# Patient Record
Sex: MALE | Race: WHITE | NOT HISPANIC OR LATINO | URBAN - METROPOLITAN AREA
[De-identification: names, ages, dates, MRNs, and addresses within clinical notes are randomized per-mention and may not be internally consistent; named-entity substitution may affect disease eponyms.]

---

## 2017-08-25 ENCOUNTER — OUTPATIENT (OUTPATIENT)
Dept: OUTPATIENT SERVICES | Facility: HOSPITAL | Age: 77
LOS: 1 days | Discharge: HOME | End: 2017-08-25
Payer: MEDICARE

## 2017-08-25 DIAGNOSIS — Z86.010 PERSONAL HISTORY OF COLONIC POLYPS: ICD-10-CM

## 2017-08-25 DIAGNOSIS — E78.5 HYPERLIPIDEMIA, UNSPECIFIED: ICD-10-CM

## 2017-08-25 DIAGNOSIS — I10 ESSENTIAL (PRIMARY) HYPERTENSION: ICD-10-CM

## 2017-08-25 DIAGNOSIS — Z98.89 OTHER SPECIFIED POSTPROCEDURAL STATES: Chronic | ICD-10-CM

## 2017-08-25 DIAGNOSIS — D12.0 BENIGN NEOPLASM OF CECUM: ICD-10-CM

## 2017-08-25 DIAGNOSIS — K64.8 OTHER HEMORRHOIDS: ICD-10-CM

## 2017-08-25 DIAGNOSIS — D12.3 BENIGN NEOPLASM OF TRANSVERSE COLON: ICD-10-CM

## 2017-08-25 DIAGNOSIS — K57.30 DIVERTICULOSIS OF LARGE INTESTINE WITHOUT PERFORATION OR ABSCESS WITHOUT BLEEDING: ICD-10-CM

## 2017-08-25 DIAGNOSIS — Z95.2 PRESENCE OF PROSTHETIC HEART VALVE: Chronic | ICD-10-CM

## 2017-08-25 DIAGNOSIS — Z01.818 ENCOUNTER FOR OTHER PREPROCEDURAL EXAMINATION: ICD-10-CM

## 2017-08-25 DIAGNOSIS — Z12.11 ENCOUNTER FOR SCREENING FOR MALIGNANT NEOPLASM OF COLON: ICD-10-CM

## 2017-08-25 LAB
ANION GAP SERPL CALC-SCNC: 9 MMOL/L — SIGNIFICANT CHANGE UP (ref 5–17)
BASOPHILS # BLD AUTO: 0.1 K/UL — SIGNIFICANT CHANGE UP (ref 0–0.2)
BASOPHILS NFR BLD AUTO: 1.2 % — SIGNIFICANT CHANGE UP (ref 0–2)
BUN SERPL-MCNC: 24 MG/DL — HIGH (ref 7–23)
CALCIUM SERPL-MCNC: 10 MG/DL — SIGNIFICANT CHANGE UP (ref 8.5–10.1)
CHLORIDE SERPL-SCNC: 107 MMOL/L — SIGNIFICANT CHANGE UP (ref 96–108)
CO2 SERPL-SCNC: 25 MMOL/L — SIGNIFICANT CHANGE UP (ref 22–31)
CREAT SERPL-MCNC: 1.5 MG/DL — HIGH (ref 0.5–1.3)
EOSINOPHIL # BLD AUTO: 0.1 K/UL — SIGNIFICANT CHANGE UP (ref 0–0.5)
EOSINOPHIL NFR BLD AUTO: 1.4 % — SIGNIFICANT CHANGE UP (ref 0–6)
GLUCOSE SERPL-MCNC: 107 MG/DL — HIGH (ref 70–99)
HCT VFR BLD CALC: 49.2 % — SIGNIFICANT CHANGE UP (ref 39–50)
HGB BLD-MCNC: 16.9 G/DL — SIGNIFICANT CHANGE UP (ref 13–17)
LYMPHOCYTES # BLD AUTO: 3.2 K/UL — SIGNIFICANT CHANGE UP (ref 1–3.3)
LYMPHOCYTES # BLD AUTO: 34.8 % — SIGNIFICANT CHANGE UP (ref 13–44)
MCHC RBC-ENTMCNC: 31 PG — SIGNIFICANT CHANGE UP (ref 27–34)
MCHC RBC-ENTMCNC: 34.4 GM/DL — SIGNIFICANT CHANGE UP (ref 32–36)
MCV RBC AUTO: 90.1 FL — SIGNIFICANT CHANGE UP (ref 80–100)
MONOCYTES # BLD AUTO: 0.7 K/UL — SIGNIFICANT CHANGE UP (ref 0–0.9)
MONOCYTES NFR BLD AUTO: 7.8 % — SIGNIFICANT CHANGE UP (ref 2–14)
NEUTROPHILS # BLD AUTO: 5 K/UL — SIGNIFICANT CHANGE UP (ref 1.8–7.4)
NEUTROPHILS NFR BLD AUTO: 54.8 % — SIGNIFICANT CHANGE UP (ref 43–77)
PLATELET # BLD AUTO: 166 K/UL — SIGNIFICANT CHANGE UP (ref 150–400)
POTASSIUM SERPL-MCNC: 4.1 MMOL/L — SIGNIFICANT CHANGE UP (ref 3.5–5.3)
POTASSIUM SERPL-SCNC: 4.1 MMOL/L — SIGNIFICANT CHANGE UP (ref 3.5–5.3)
RBC # BLD: 5.46 M/UL — SIGNIFICANT CHANGE UP (ref 4.2–5.8)
RBC # FLD: 13.7 % — SIGNIFICANT CHANGE UP (ref 10.3–14.5)
SODIUM SERPL-SCNC: 141 MMOL/L — SIGNIFICANT CHANGE UP (ref 135–145)
WBC # BLD: 9.1 K/UL — SIGNIFICANT CHANGE UP (ref 3.8–10.5)
WBC # FLD AUTO: 9.1 K/UL — SIGNIFICANT CHANGE UP (ref 3.8–10.5)

## 2017-08-25 PROCEDURE — 93010 ELECTROCARDIOGRAM REPORT: CPT

## 2017-08-25 NOTE — CHART NOTE - NSCHARTNOTEFT_GEN_A_CORE
Vital signs  Pulse 59  B/P 150/71  Temperature 97.5  Respiration 18  SpO2 98%  Plan  1. Expect a call from Endoscopy the day before your procedure between 11am and 3pm.  2. Follow the GI doctor's instructions for preparation  and day before procedure activities.  3. Follow the GI doctor's  instructions for medications.

## 2017-08-25 NOTE — ASU PATIENT PROFILE, ADULT - PMH
MIRA (acute kidney injury)    Anxiety    Essential hypertension    History of colon polyps    Hyperlipidemia, unspecified hyperlipidemia type    Nonrheumatic aortic valve stenosis    Obesity, unspecified obesity severity, unspecified obesity type    Renal calculi

## 2017-08-25 NOTE — ASU PATIENT PROFILE, ADULT - PSH
History of lithotripsy    S/P AVR (aortic valve replacement)  7/1/2016  S/P knee surgery  bilateral torn meniscus

## 2017-08-25 NOTE — ASU PATIENT PROFILE, ADULT - VISION (WITH CORRECTIVE LENSES IF THE PATIENT USUALLY WEARS THEM):
Normal vision: sees adequately in most situations; can see medication labels, newsprint/bifocal lenses

## 2017-09-01 ENCOUNTER — RESULT REVIEW (OUTPATIENT)
Age: 77
End: 2017-09-01

## 2017-09-01 ENCOUNTER — OUTPATIENT (OUTPATIENT)
Dept: OUTPATIENT SERVICES | Facility: HOSPITAL | Age: 77
LOS: 1 days | Discharge: HOME | End: 2017-09-01
Payer: MEDICARE

## 2017-09-01 VITALS
TEMPERATURE: 97 F | OXYGEN SATURATION: 100 % | RESPIRATION RATE: 13 BRPM | DIASTOLIC BLOOD PRESSURE: 90 MMHG | WEIGHT: 214.95 LBS | HEART RATE: 70 BPM | SYSTOLIC BLOOD PRESSURE: 162 MMHG | HEIGHT: 67 IN

## 2017-09-01 DIAGNOSIS — Z98.89 OTHER SPECIFIED POSTPROCEDURAL STATES: Chronic | ICD-10-CM

## 2017-09-01 DIAGNOSIS — Z95.2 PRESENCE OF PROSTHETIC HEART VALVE: Chronic | ICD-10-CM

## 2017-09-01 PROCEDURE — 88305 TISSUE EXAM BY PATHOLOGIST: CPT | Mod: 26

## 2017-09-01 RX ORDER — ALLOPURINOL 300 MG
1 TABLET ORAL
Qty: 0 | Refills: 0 | COMMUNITY

## 2017-09-01 RX ORDER — SODIUM CHLORIDE 9 MG/ML
1000 INJECTION INTRAMUSCULAR; INTRAVENOUS; SUBCUTANEOUS
Qty: 0 | Refills: 0 | Status: DISCONTINUED | OUTPATIENT
Start: 2017-09-01 | End: 2017-09-16

## 2017-09-01 RX ORDER — ESCITALOPRAM OXALATE 10 MG/1
1 TABLET, FILM COATED ORAL
Qty: 0 | Refills: 0 | COMMUNITY

## 2017-09-01 NOTE — ASU PATIENT PROFILE, ADULT - PMH
MIRA (acute kidney injury)    Anxiety    Essential hypertension    Gout    History of colon polyps    Hyperlipidemia, unspecified hyperlipidemia type    Nonrheumatic aortic valve stenosis    Obesity, unspecified obesity severity, unspecified obesity type    Renal calculi

## 2017-09-05 LAB — SURGICAL PATHOLOGY FINAL REPORT - CH: SIGNIFICANT CHANGE UP

## 2017-09-11 DIAGNOSIS — D12.0 BENIGN NEOPLASM OF CECUM: ICD-10-CM

## 2017-09-11 DIAGNOSIS — Z12.11 ENCOUNTER FOR SCREENING FOR MALIGNANT NEOPLASM OF COLON: ICD-10-CM

## 2017-09-11 DIAGNOSIS — E78.5 HYPERLIPIDEMIA, UNSPECIFIED: ICD-10-CM

## 2017-09-11 DIAGNOSIS — K57.30 DIVERTICULOSIS OF LARGE INTESTINE WITHOUT PERFORATION OR ABSCESS WITHOUT BLEEDING: ICD-10-CM

## 2017-09-11 DIAGNOSIS — Z95.2 PRESENCE OF PROSTHETIC HEART VALVE: ICD-10-CM

## 2017-09-11 DIAGNOSIS — F41.9 ANXIETY DISORDER, UNSPECIFIED: ICD-10-CM

## 2017-09-11 DIAGNOSIS — K64.8 OTHER HEMORRHOIDS: ICD-10-CM

## 2017-09-11 DIAGNOSIS — D12.3 BENIGN NEOPLASM OF TRANSVERSE COLON: ICD-10-CM

## 2017-09-11 DIAGNOSIS — Z79.82 LONG TERM (CURRENT) USE OF ASPIRIN: ICD-10-CM

## 2017-09-11 DIAGNOSIS — I10 ESSENTIAL (PRIMARY) HYPERTENSION: ICD-10-CM

## 2017-09-11 DIAGNOSIS — M10.9 GOUT, UNSPECIFIED: ICD-10-CM

## 2019-07-18 ENCOUNTER — EMERGENCY (EMERGENCY)
Facility: HOSPITAL | Age: 79
LOS: 0 days | Discharge: HOME | End: 2019-07-18
Attending: EMERGENCY MEDICINE | Admitting: EMERGENCY MEDICINE
Payer: MEDICARE

## 2019-07-18 VITALS — HEIGHT: 67 IN | WEIGHT: 210.98 LBS

## 2019-07-18 VITALS
RESPIRATION RATE: 16 BRPM | OXYGEN SATURATION: 100 % | TEMPERATURE: 98 F | SYSTOLIC BLOOD PRESSURE: 153 MMHG | DIASTOLIC BLOOD PRESSURE: 77 MMHG

## 2019-07-18 DIAGNOSIS — I10 ESSENTIAL (PRIMARY) HYPERTENSION: ICD-10-CM

## 2019-07-18 DIAGNOSIS — R19.7 DIARRHEA, UNSPECIFIED: ICD-10-CM

## 2019-07-18 DIAGNOSIS — Z98.89 OTHER SPECIFIED POSTPROCEDURAL STATES: Chronic | ICD-10-CM

## 2019-07-18 DIAGNOSIS — Z87.442 PERSONAL HISTORY OF URINARY CALCULI: ICD-10-CM

## 2019-07-18 DIAGNOSIS — E78.5 HYPERLIPIDEMIA, UNSPECIFIED: ICD-10-CM

## 2019-07-18 DIAGNOSIS — R19.5 OTHER FECAL ABNORMALITIES: ICD-10-CM

## 2019-07-18 DIAGNOSIS — Z95.2 PRESENCE OF PROSTHETIC HEART VALVE: ICD-10-CM

## 2019-07-18 DIAGNOSIS — Z79.82 LONG TERM (CURRENT) USE OF ASPIRIN: ICD-10-CM

## 2019-07-18 DIAGNOSIS — M10.9 GOUT, UNSPECIFIED: ICD-10-CM

## 2019-07-18 DIAGNOSIS — Z95.2 PRESENCE OF PROSTHETIC HEART VALVE: Chronic | ICD-10-CM

## 2019-07-18 DIAGNOSIS — K92.1 MELENA: ICD-10-CM

## 2019-07-18 LAB
ALBUMIN SERPL ELPH-MCNC: 3.4 G/DL — SIGNIFICANT CHANGE UP (ref 3.3–5)
ALP SERPL-CCNC: 100 U/L — SIGNIFICANT CHANGE UP (ref 40–120)
ALT FLD-CCNC: 27 U/L — SIGNIFICANT CHANGE UP (ref 12–78)
ANION GAP SERPL CALC-SCNC: 6 MMOL/L — SIGNIFICANT CHANGE UP (ref 5–17)
APTT BLD: 28.1 SEC — SIGNIFICANT CHANGE UP (ref 27.5–36.3)
AST SERPL-CCNC: 28 U/L — SIGNIFICANT CHANGE UP (ref 15–37)
BASOPHILS # BLD AUTO: 0.03 K/UL — SIGNIFICANT CHANGE UP (ref 0–0.2)
BASOPHILS NFR BLD AUTO: 0.5 % — SIGNIFICANT CHANGE UP (ref 0–2)
BILIRUB SERPL-MCNC: 0.4 MG/DL — SIGNIFICANT CHANGE UP (ref 0.2–1.2)
BUN SERPL-MCNC: 25 MG/DL — HIGH (ref 7–23)
CALCIUM SERPL-MCNC: 9.3 MG/DL — SIGNIFICANT CHANGE UP (ref 8.5–10.1)
CHLORIDE SERPL-SCNC: 108 MMOL/L — SIGNIFICANT CHANGE UP (ref 96–108)
CO2 SERPL-SCNC: 28 MMOL/L — SIGNIFICANT CHANGE UP (ref 22–31)
CREAT SERPL-MCNC: 1.48 MG/DL — HIGH (ref 0.5–1.3)
EOSINOPHIL # BLD AUTO: 0.09 K/UL — SIGNIFICANT CHANGE UP (ref 0–0.5)
EOSINOPHIL NFR BLD AUTO: 1.4 % — SIGNIFICANT CHANGE UP (ref 0–6)
GLUCOSE SERPL-MCNC: 171 MG/DL — HIGH (ref 70–99)
HCT VFR BLD CALC: 43.7 % — SIGNIFICANT CHANGE UP (ref 39–50)
HGB BLD-MCNC: 14.8 G/DL — SIGNIFICANT CHANGE UP (ref 13–17)
IMM GRANULOCYTES NFR BLD AUTO: 0.2 % — SIGNIFICANT CHANGE UP (ref 0–1.5)
INR BLD: 1.15 RATIO — SIGNIFICANT CHANGE UP (ref 0.88–1.16)
LYMPHOCYTES # BLD AUTO: 1.87 K/UL — SIGNIFICANT CHANGE UP (ref 1–3.3)
LYMPHOCYTES # BLD AUTO: 28.3 % — SIGNIFICANT CHANGE UP (ref 13–44)
MCHC RBC-ENTMCNC: 31 PG — SIGNIFICANT CHANGE UP (ref 27–34)
MCHC RBC-ENTMCNC: 33.9 GM/DL — SIGNIFICANT CHANGE UP (ref 32–36)
MCV RBC AUTO: 91.4 FL — SIGNIFICANT CHANGE UP (ref 80–100)
MONOCYTES # BLD AUTO: 0.49 K/UL — SIGNIFICANT CHANGE UP (ref 0–0.9)
MONOCYTES NFR BLD AUTO: 7.4 % — SIGNIFICANT CHANGE UP (ref 2–14)
NEUTROPHILS # BLD AUTO: 4.12 K/UL — SIGNIFICANT CHANGE UP (ref 1.8–7.4)
NEUTROPHILS NFR BLD AUTO: 62.2 % — SIGNIFICANT CHANGE UP (ref 43–77)
PLATELET # BLD AUTO: 151 K/UL — SIGNIFICANT CHANGE UP (ref 150–400)
POTASSIUM SERPL-MCNC: 4.2 MMOL/L — SIGNIFICANT CHANGE UP (ref 3.5–5.3)
POTASSIUM SERPL-SCNC: 4.2 MMOL/L — SIGNIFICANT CHANGE UP (ref 3.5–5.3)
PROT SERPL-MCNC: 6.9 GM/DL — SIGNIFICANT CHANGE UP (ref 6–8.3)
PROTHROM AB SERPL-ACNC: 12.8 SEC — SIGNIFICANT CHANGE UP (ref 10–12.9)
RBC # BLD: 4.78 M/UL — SIGNIFICANT CHANGE UP (ref 4.2–5.8)
RBC # FLD: 14.2 % — SIGNIFICANT CHANGE UP (ref 10.3–14.5)
SODIUM SERPL-SCNC: 142 MMOL/L — SIGNIFICANT CHANGE UP (ref 135–145)
WBC # BLD: 6.61 K/UL — SIGNIFICANT CHANGE UP (ref 3.8–10.5)
WBC # FLD AUTO: 6.61 K/UL — SIGNIFICANT CHANGE UP (ref 3.8–10.5)

## 2019-07-18 PROCEDURE — 93010 ELECTROCARDIOGRAM REPORT: CPT

## 2019-07-18 PROCEDURE — 99284 EMERGENCY DEPT VISIT MOD MDM: CPT

## 2019-07-18 NOTE — ED PROVIDER NOTE - NSFOLLOWUPINSTRUCTIONS_ED_ALL_ED_FT
please call and follow up with your doctor in 1- 3 days.    Acute Diarrhea    WHAT YOU NEED TO KNOW:    Acute diarrhea starts quickly and lasts a short time, usually 1 to 3 days. It can last up to 2 weeks. You may not be able to control your diarrhea. Acute diarrhea usually stops on its own.     DISCHARGE INSTRUCTIONS:    Return to the emergency department if:     You feel confused.       Your heartbeat is faster than usual.       Your eyes look deeply sunken, or you have no tears when you cry.       You urinate less than usual, or your urine is dark yellow.       You have blood or mucus in your bowel movements.      You have severe abdominal pain.       You are unable to drink any liquids.     Contact your healthcare provider if:     Your symptoms do not get better with treatment.       You have a fever higher than 101.3°F (38.5°C).       You have trouble eating and drinking because you are vomiting.       Your diarrhea does not get better in 7 days.       You have questions or concerns about your condition or care.     Follow up with your healthcare provider as directed: Write down your questions so you remember to ask them during your visits.     Medicines:    Diarrhea medicine is an over-the-counter medicine that helps slow or stop your diarrhea. Do not take this medicine unless your healthcare provider says it is okay.       Antibiotics may be given to help treat an infection caused by bacteria.       Antiparasitics may be given to treat an infection caused by parasites.       Take your medicine as directed. Contact your healthcare provider if you think your medicine is not helping or if you have side effects. Tell him of her if you are allergic to any medicine. Keep a list of the medicines, vitamins, and herbs you take. Include the amounts, and when and why you take them. Bring the list or the pill bottles to follow-up visits. Carry your medicine list with you in case of an emergency.    Self-care:     Drink liquids as directed. Liquids will help prevent dehydration caused by diarrhea. Ask your healthcare provider how much liquid to drink each day and which liquids are best for you. You may need to drink an oral rehydration solution (ORS). An ORS has the right amounts of water, salts, and sugar you need to replace body fluids. You can buy an ORS at most grocery stores and pharmacies.       Eat foods that are easy to digest. Examples include rice, lentils, cereal, bananas, potatoes, and bread. It also includes some fruits (bananas, melon), well-cooked vegetables, and lean meats. Do not eat foods high in fiber, fat, and sugar. Do not drink alcohol until your diarrhea is gone.     Prevent acute diarrhea:     Wash your hands often. Use soap and water. Wash your hands before you eat or prepare food. Also wash your hands after you use the bathroom. Use an alcohol-based hand gel when soap and water are not available. Handwashing           Keep bathroom surfaces clean. This helps prevent the spread of germs that cause acute diarrhea.       Wash fruits and vegetables well before you eat them. This can help remove germs that cause diarrhea. If possible, remove the skin from fruits and vegetables, or cook them well before you eat them.       Cook meat and poultry as directed. Meat includes beef and pork. Poultry includes chicken, turkey, and duck.  Cook ground meat to 160°F.       Cook ground poultry, whole poultry, or cuts of poultry to at least 165°F. Remove the poultry from heat. Let it stand for 3 minutes before you eat it.       Cook whole cuts of meat other than poultry to at least 145°F. Remove the meat from heat. Let it stand for 3 minutes before you eat it.       Wash dishes that have touched raw meat or poultry with hot water and soap. This includes cutting boards, utensils, dishes, and serving containers.       Place raw or cooked meat or poultry in the refrigerator as soon as possible. Bacteria can grow in meat or poultry that is left at room temperature too long.       Do not eat raw or undercooked oysters, clams, or mussels. These foods may be contaminated and cause infection.       Drink only filtered or treated water when you travel. Do not put ice in your drinks. Drink bottled water whenever possible.

## 2019-07-18 NOTE — ED ADULT TRIAGE NOTE - CHIEF COMPLAINT QUOTE
pt aaox4, pt presents to er for "black stool" and diarrhea episode since yesterday, denies dizziness, abd pain.  pt takes aspirin.

## 2019-07-18 NOTE — ED PROVIDER NOTE - CLINICAL SUMMARY MEDICAL DECISION MAKING FREE TEXT BOX
79 y/o pt presents for dark firm stool. On rectal exam pt with brown stool that is heme positive. Plan check labs.

## 2019-07-18 NOTE — ED PROVIDER NOTE - OBJECTIVE STATEMENT
77 y/o male with a PMHx of HTN, HLD, gout, colon polyps, s/p mitral valve replacement presents to the ED c/o melena since yesterday. Pt states he woke up yesterday and had an episode of loose "black" stool. This morning pt had an episode of hard black stool, with subsequent episode of diarrhea. Pt reports appetite is at baseline but notes he was drinking more beer than normal with some friends this past weekend. Denies eating any abnormally dark foods. Takes 81 mg ASA every day, fish oil, B12, grape seed extract. Presents with wife at bedside.

## 2019-07-22 ENCOUNTER — RESULT REVIEW (OUTPATIENT)
Age: 79
End: 2019-07-22

## 2019-07-22 ENCOUNTER — OUTPATIENT (OUTPATIENT)
Dept: OUTPATIENT SERVICES | Facility: HOSPITAL | Age: 79
LOS: 1 days | Discharge: HOME | End: 2019-07-22
Payer: MEDICARE

## 2019-07-22 VITALS
HEIGHT: 67 IN | WEIGHT: 210.98 LBS | OXYGEN SATURATION: 97 % | TEMPERATURE: 97 F | DIASTOLIC BLOOD PRESSURE: 80 MMHG | SYSTOLIC BLOOD PRESSURE: 157 MMHG | HEART RATE: 57 BPM | RESPIRATION RATE: 14 BRPM

## 2019-07-22 DIAGNOSIS — Z95.2 PRESENCE OF PROSTHETIC HEART VALVE: Chronic | ICD-10-CM

## 2019-07-22 DIAGNOSIS — Z98.89 OTHER SPECIFIED POSTPROCEDURAL STATES: Chronic | ICD-10-CM

## 2019-07-22 PROCEDURE — 88312 SPECIAL STAINS GROUP 1: CPT | Mod: 26

## 2019-07-22 PROCEDURE — 88342 IMHCHEM/IMCYTCHM 1ST ANTB: CPT | Mod: 26

## 2019-07-22 PROCEDURE — 88305 TISSUE EXAM BY PATHOLOGIST: CPT | Mod: 26

## 2019-07-24 LAB — SURGICAL PATHOLOGY STUDY: SIGNIFICANT CHANGE UP

## 2019-07-25 DIAGNOSIS — I10 ESSENTIAL (PRIMARY) HYPERTENSION: ICD-10-CM

## 2019-07-25 DIAGNOSIS — B96.81 HELICOBACTER PYLORI [H. PYLORI] AS THE CAUSE OF DISEASES CLASSIFIED ELSEWHERE: ICD-10-CM

## 2019-07-25 DIAGNOSIS — K44.9 DIAPHRAGMATIC HERNIA WITHOUT OBSTRUCTION OR GANGRENE: ICD-10-CM

## 2019-07-25 DIAGNOSIS — K29.50 UNSPECIFIED CHRONIC GASTRITIS WITHOUT BLEEDING: ICD-10-CM

## 2019-07-25 DIAGNOSIS — M10.9 GOUT, UNSPECIFIED: ICD-10-CM

## 2019-07-25 DIAGNOSIS — K92.1 MELENA: ICD-10-CM

## 2020-01-30 NOTE — ED ADULT TRIAGE NOTE - WEIGHT IN LBS
[facility], via phone on 1/30/20.  Martha can be reached at [phone]    INR date: 1/30/20  INR result: 2.3    Tracking has been updated.      RN reports patient takes 3.75mg on Tuesday and 2.5mg on all other days  of warfarin using 2.5mg tabs.  RN states there have been no changes in diet or medications, physical activity, or alcohol.   Denies any recent illnesses, fever, nausea, vomiting or diarrhea.    Electronically signed: FESTUS Olivas       210.9

## 2020-09-22 ENCOUNTER — APPOINTMENT (OUTPATIENT)
Dept: ORTHOPEDIC SURGERY | Facility: CLINIC | Age: 80
End: 2020-09-22
Payer: MEDICARE

## 2020-09-22 VITALS
HEART RATE: 73 BPM | BODY MASS INDEX: 35.68 KG/M2 | TEMPERATURE: 97.8 F | SYSTOLIC BLOOD PRESSURE: 143 MMHG | DIASTOLIC BLOOD PRESSURE: 74 MMHG | HEIGHT: 66 IN | WEIGHT: 222 LBS

## 2020-09-22 DIAGNOSIS — Z86.79 PERSONAL HISTORY OF OTHER DISEASES OF THE CIRCULATORY SYSTEM: ICD-10-CM

## 2020-09-22 DIAGNOSIS — Z82.61 FAMILY HISTORY OF ARTHRITIS: ICD-10-CM

## 2020-09-22 DIAGNOSIS — Z87.39 PERSONAL HISTORY OF OTHER DISEASES OF THE MUSCULOSKELETAL SYSTEM AND CONNECTIVE TISSUE: ICD-10-CM

## 2020-09-22 DIAGNOSIS — Z86.39 PERSONAL HISTORY OF OTHER ENDOCRINE, NUTRITIONAL AND METABOLIC DISEASE: ICD-10-CM

## 2020-09-22 DIAGNOSIS — Z78.9 OTHER SPECIFIED HEALTH STATUS: ICD-10-CM

## 2020-09-22 PROCEDURE — 20610 DRAIN/INJ JOINT/BURSA W/O US: CPT

## 2020-09-22 PROCEDURE — 73562 X-RAY EXAM OF KNEE 3: CPT | Mod: 50

## 2020-09-22 PROCEDURE — 99203 OFFICE O/P NEW LOW 30 MIN: CPT | Mod: 25

## 2020-09-22 RX ORDER — SIMVASTATIN 40 MG/1
40 TABLET, FILM COATED ORAL
Refills: 0 | Status: ACTIVE | COMMUNITY

## 2020-09-22 RX ORDER — ALLOPURINOL 300 MG/1
300 TABLET ORAL
Refills: 0 | Status: ACTIVE | COMMUNITY

## 2020-09-22 RX ORDER — METOPROLOL TARTRATE 50 MG/1
50 TABLET, FILM COATED ORAL
Refills: 0 | Status: ACTIVE | COMMUNITY

## 2020-09-22 RX ORDER — AMLODIPINE BESYLATE 5 MG/1
5 TABLET ORAL
Refills: 0 | Status: ACTIVE | COMMUNITY

## 2020-09-22 RX ADMIN — LIDOCAINE HYDROCHLORIDE 3 %: 10 INJECTION, SOLUTION INFILTRATION; PERINEURAL at 00:00

## 2020-09-22 RX ADMIN — Medication 0 MG/3ML: at 00:00

## 2020-09-23 RX ORDER — LIDOCAINE HYDROCHLORIDE 10 MG/ML
1 INJECTION, SOLUTION INFILTRATION; PERINEURAL
Refills: 0 | Status: COMPLETED | OUTPATIENT
Start: 2020-09-22

## 2020-09-23 RX ORDER — HYALURONATE SOD, CROSS-LINKED 30 MG/3 ML
30 SYRINGE (ML) INTRAARTICULAR
Refills: 0 | Status: COMPLETED | OUTPATIENT
Start: 2020-09-22

## 2020-12-09 ENCOUNTER — OUTPATIENT (OUTPATIENT)
Dept: OUTPATIENT SERVICES | Facility: HOSPITAL | Age: 80
LOS: 1 days | End: 2020-12-09
Payer: MEDICARE

## 2020-12-09 DIAGNOSIS — Z98.89 OTHER SPECIFIED POSTPROCEDURAL STATES: Chronic | ICD-10-CM

## 2020-12-09 DIAGNOSIS — Z11.59 ENCOUNTER FOR SCREENING FOR OTHER VIRAL DISEASES: ICD-10-CM

## 2020-12-09 DIAGNOSIS — Z95.2 PRESENCE OF PROSTHETIC HEART VALVE: Chronic | ICD-10-CM

## 2020-12-09 LAB — SARS-COV-2 RNA SPEC QL NAA+PROBE: DETECTED

## 2020-12-09 PROCEDURE — U0003: CPT

## 2020-12-10 DIAGNOSIS — Z11.59 ENCOUNTER FOR SCREENING FOR OTHER VIRAL DISEASES: ICD-10-CM

## 2020-12-17 ENCOUNTER — INPATIENT (INPATIENT)
Facility: HOSPITAL | Age: 80
LOS: 3 days | Discharge: HOME | DRG: 377 | End: 2020-12-21
Attending: FAMILY MEDICINE | Admitting: HOSPITALIST
Payer: MEDICARE

## 2020-12-17 VITALS
TEMPERATURE: 98 F | HEIGHT: 67 IN | WEIGHT: 162.92 LBS | RESPIRATION RATE: 15 BRPM | SYSTOLIC BLOOD PRESSURE: 127 MMHG | OXYGEN SATURATION: 97 % | HEART RATE: 66 BPM | DIASTOLIC BLOOD PRESSURE: 67 MMHG

## 2020-12-17 DIAGNOSIS — Z98.89 OTHER SPECIFIED POSTPROCEDURAL STATES: Chronic | ICD-10-CM

## 2020-12-17 DIAGNOSIS — K92.2 GASTROINTESTINAL HEMORRHAGE, UNSPECIFIED: ICD-10-CM

## 2020-12-17 DIAGNOSIS — Z95.2 PRESENCE OF PROSTHETIC HEART VALVE: Chronic | ICD-10-CM

## 2020-12-17 LAB
ALBUMIN SERPL ELPH-MCNC: 2.4 G/DL — LOW (ref 3.3–5)
ALBUMIN SERPL ELPH-MCNC: 2.4 G/DL — LOW (ref 3.3–5)
ALP SERPL-CCNC: 77 U/L — SIGNIFICANT CHANGE UP (ref 40–120)
ALP SERPL-CCNC: 99 U/L — SIGNIFICANT CHANGE UP (ref 40–120)
ALT FLD-CCNC: 35 U/L — SIGNIFICANT CHANGE UP (ref 12–78)
ALT FLD-CCNC: 49 U/L — SIGNIFICANT CHANGE UP (ref 12–78)
ANION GAP SERPL CALC-SCNC: 11 MMOL/L — SIGNIFICANT CHANGE UP (ref 5–17)
APTT BLD: 30.7 SEC — SIGNIFICANT CHANGE UP (ref 27.5–35.5)
AST SERPL-CCNC: 46 U/L — HIGH (ref 15–37)
AST SERPL-CCNC: 72 U/L — HIGH (ref 15–37)
BASOPHILS # BLD AUTO: 0 K/UL — SIGNIFICANT CHANGE UP (ref 0–0.2)
BASOPHILS NFR BLD AUTO: 0 % — SIGNIFICANT CHANGE UP (ref 0–2)
BILIRUB DIRECT SERPL-MCNC: 0.1 MG/DL — SIGNIFICANT CHANGE UP (ref 0–0.2)
BILIRUB INDIRECT FLD-MCNC: 0.2 MG/DL — SIGNIFICANT CHANGE UP (ref 0.2–1)
BILIRUB SERPL-MCNC: 0.3 MG/DL — SIGNIFICANT CHANGE UP (ref 0.2–1.2)
BILIRUB SERPL-MCNC: 0.4 MG/DL — SIGNIFICANT CHANGE UP (ref 0.2–1.2)
BUN SERPL-MCNC: 46 MG/DL — HIGH (ref 7–23)
CALCIUM SERPL-MCNC: 8.8 MG/DL — SIGNIFICANT CHANGE UP (ref 8.5–10.1)
CHLORIDE SERPL-SCNC: 109 MMOL/L — HIGH (ref 96–108)
CO2 SERPL-SCNC: 20 MMOL/L — LOW (ref 22–31)
CREAT SERPL-MCNC: 1.58 MG/DL — HIGH (ref 0.5–1.3)
CREAT SERPL-MCNC: 1.71 MG/DL — HIGH (ref 0.5–1.3)
EOSINOPHIL # BLD AUTO: 0 K/UL — SIGNIFICANT CHANGE UP (ref 0–0.5)
EOSINOPHIL NFR BLD AUTO: 0 % — SIGNIFICANT CHANGE UP (ref 0–6)
GLUCOSE SERPL-MCNC: 143 MG/DL — HIGH (ref 70–99)
HCT VFR BLD CALC: 38.9 % — LOW (ref 39–50)
HGB BLD-MCNC: 13 G/DL — SIGNIFICANT CHANGE UP (ref 13–17)
IMM GRANULOCYTES NFR BLD AUTO: 0.6 % — SIGNIFICANT CHANGE UP (ref 0–1.5)
INR BLD: 1.24 RATIO — HIGH (ref 0.88–1.16)
LYMPHOCYTES # BLD AUTO: 1.7 K/UL — SIGNIFICANT CHANGE UP (ref 1–3.3)
LYMPHOCYTES # BLD AUTO: 26.6 % — SIGNIFICANT CHANGE UP (ref 13–44)
MCHC RBC-ENTMCNC: 30.1 PG — SIGNIFICANT CHANGE UP (ref 27–34)
MCHC RBC-ENTMCNC: 33.4 GM/DL — SIGNIFICANT CHANGE UP (ref 32–36)
MCV RBC AUTO: 90 FL — SIGNIFICANT CHANGE UP (ref 80–100)
MONOCYTES # BLD AUTO: 0.48 K/UL — SIGNIFICANT CHANGE UP (ref 0–0.9)
MONOCYTES NFR BLD AUTO: 7.5 % — SIGNIFICANT CHANGE UP (ref 2–14)
NEUTROPHILS # BLD AUTO: 4.18 K/UL — SIGNIFICANT CHANGE UP (ref 1.8–7.4)
NEUTROPHILS NFR BLD AUTO: 65.3 % — SIGNIFICANT CHANGE UP (ref 43–77)
PLATELET # BLD AUTO: 139 K/UL — LOW (ref 150–400)
POTASSIUM SERPL-MCNC: 4.8 MMOL/L — SIGNIFICANT CHANGE UP (ref 3.5–5.3)
POTASSIUM SERPL-SCNC: 4.8 MMOL/L — SIGNIFICANT CHANGE UP (ref 3.5–5.3)
PROT SERPL-MCNC: 5.7 GM/DL — LOW (ref 6–8.3)
PROT SERPL-MCNC: 6.5 GM/DL — SIGNIFICANT CHANGE UP (ref 6–8.3)
PROTHROM AB SERPL-ACNC: 14.2 SEC — HIGH (ref 10.6–13.6)
RBC # BLD: 4.32 M/UL — SIGNIFICANT CHANGE UP (ref 4.2–5.8)
RBC # FLD: 14.6 % — HIGH (ref 10.3–14.5)
SARS-COV-2 RNA SPEC QL NAA+PROBE: DETECTED
SODIUM SERPL-SCNC: 140 MMOL/L — SIGNIFICANT CHANGE UP (ref 135–145)
WBC # BLD: 6.4 K/UL — SIGNIFICANT CHANGE UP (ref 3.8–10.5)
WBC # FLD AUTO: 6.4 K/UL — SIGNIFICANT CHANGE UP (ref 3.8–10.5)

## 2020-12-17 PROCEDURE — 86140 C-REACTIVE PROTEIN: CPT

## 2020-12-17 PROCEDURE — 99223 1ST HOSP IP/OBS HIGH 75: CPT | Mod: CS,AI

## 2020-12-17 PROCEDURE — 80048 BASIC METABOLIC PNL TOTAL CA: CPT

## 2020-12-17 PROCEDURE — 85025 COMPLETE CBC W/AUTO DIFF WBC: CPT

## 2020-12-17 PROCEDURE — 93005 ELECTROCARDIOGRAM TRACING: CPT

## 2020-12-17 PROCEDURE — 82962 GLUCOSE BLOOD TEST: CPT

## 2020-12-17 PROCEDURE — 85379 FIBRIN DEGRADATION QUANT: CPT

## 2020-12-17 PROCEDURE — 97116 GAIT TRAINING THERAPY: CPT | Mod: GP

## 2020-12-17 PROCEDURE — 70450 CT HEAD/BRAIN W/O DYE: CPT

## 2020-12-17 PROCEDURE — 36415 COLL VENOUS BLD VENIPUNCTURE: CPT

## 2020-12-17 PROCEDURE — 74174 CTA ABD&PLVS W/CONTRAST: CPT

## 2020-12-17 PROCEDURE — C9113: CPT

## 2020-12-17 PROCEDURE — 82565 ASSAY OF CREATININE: CPT

## 2020-12-17 PROCEDURE — 83036 HEMOGLOBIN GLYCOSYLATED A1C: CPT

## 2020-12-17 PROCEDURE — 97162 PT EVAL MOD COMPLEX 30 MIN: CPT | Mod: GP

## 2020-12-17 PROCEDURE — 97530 THERAPEUTIC ACTIVITIES: CPT | Mod: GP

## 2020-12-17 PROCEDURE — 85027 COMPLETE CBC AUTOMATED: CPT

## 2020-12-17 PROCEDURE — 71045 X-RAY EXAM CHEST 1 VIEW: CPT | Mod: 26

## 2020-12-17 PROCEDURE — 94640 AIRWAY INHALATION TREATMENT: CPT

## 2020-12-17 PROCEDURE — 82728 ASSAY OF FERRITIN: CPT

## 2020-12-17 PROCEDURE — 74174 CTA ABD&PLVS W/CONTRAST: CPT | Mod: 26

## 2020-12-17 PROCEDURE — 80076 HEPATIC FUNCTION PANEL: CPT

## 2020-12-17 PROCEDURE — C9399: CPT

## 2020-12-17 PROCEDURE — 86769 SARS-COV-2 COVID-19 ANTIBODY: CPT

## 2020-12-17 RX ORDER — REMDESIVIR 5 MG/ML
100 INJECTION INTRAVENOUS EVERY 24 HOURS
Refills: 0 | Status: COMPLETED | OUTPATIENT
Start: 2020-12-18 | End: 2020-12-21

## 2020-12-17 RX ORDER — ATORVASTATIN CALCIUM 80 MG/1
40 TABLET, FILM COATED ORAL AT BEDTIME
Refills: 0 | Status: DISCONTINUED | OUTPATIENT
Start: 2020-12-17 | End: 2020-12-21

## 2020-12-17 RX ORDER — PREGABALIN 225 MG/1
1000 CAPSULE ORAL DAILY
Refills: 0 | Status: DISCONTINUED | OUTPATIENT
Start: 2020-12-17 | End: 2020-12-21

## 2020-12-17 RX ORDER — METOPROLOL TARTRATE 50 MG
100 TABLET ORAL DAILY
Refills: 0 | Status: DISCONTINUED | OUTPATIENT
Start: 2020-12-17 | End: 2020-12-21

## 2020-12-17 RX ORDER — REMDESIVIR 5 MG/ML
INJECTION INTRAVENOUS
Refills: 0 | Status: COMPLETED | OUTPATIENT
Start: 2020-12-17 | End: 2020-12-21

## 2020-12-17 RX ORDER — PANTOPRAZOLE SODIUM 20 MG/1
8 TABLET, DELAYED RELEASE ORAL
Qty: 80 | Refills: 0 | Status: DISCONTINUED | OUTPATIENT
Start: 2020-12-17 | End: 2020-12-21

## 2020-12-17 RX ORDER — SODIUM CHLORIDE 9 MG/ML
1000 INJECTION INTRAMUSCULAR; INTRAVENOUS; SUBCUTANEOUS
Refills: 0 | Status: DISCONTINUED | OUTPATIENT
Start: 2020-12-17 | End: 2020-12-21

## 2020-12-17 RX ORDER — DEXAMETHASONE 0.5 MG/5ML
6 ELIXIR ORAL DAILY
Refills: 0 | Status: DISCONTINUED | OUTPATIENT
Start: 2020-12-17 | End: 2020-12-21

## 2020-12-17 RX ORDER — REMDESIVIR 5 MG/ML
200 INJECTION INTRAVENOUS EVERY 24 HOURS
Refills: 0 | Status: COMPLETED | OUTPATIENT
Start: 2020-12-17 | End: 2020-12-17

## 2020-12-17 RX ORDER — AMLODIPINE BESYLATE 2.5 MG/1
5 TABLET ORAL AT BEDTIME
Refills: 0 | Status: DISCONTINUED | OUTPATIENT
Start: 2020-12-17 | End: 2020-12-21

## 2020-12-17 RX ADMIN — ATORVASTATIN CALCIUM 40 MILLIGRAM(S): 80 TABLET, FILM COATED ORAL at 22:36

## 2020-12-17 RX ADMIN — PANTOPRAZOLE SODIUM 10 MG/HR: 20 TABLET, DELAYED RELEASE ORAL at 17:27

## 2020-12-17 RX ADMIN — SODIUM CHLORIDE 83 MILLILITER(S): 9 INJECTION INTRAMUSCULAR; INTRAVENOUS; SUBCUTANEOUS at 20:48

## 2020-12-17 RX ADMIN — AMLODIPINE BESYLATE 5 MILLIGRAM(S): 2.5 TABLET ORAL at 22:36

## 2020-12-17 RX ADMIN — PANTOPRAZOLE SODIUM 10 MG/HR: 20 TABLET, DELAYED RELEASE ORAL at 20:48

## 2020-12-17 RX ADMIN — REMDESIVIR 500 MILLIGRAM(S): 5 INJECTION INTRAVENOUS at 20:18

## 2020-12-17 RX ADMIN — Medication 6 MILLIGRAM(S): at 20:18

## 2020-12-17 NOTE — ED ADULT NURSE NOTE - NSIMPLEMENTINTERV_GEN_ALL_ED
Implemented All Fall with Harm Risk Interventions:  [place] to call system. Call bell, personal items and telephone within reach. Instruct patient to call for assistance. Room bathroom lighting operational. Non-slip footwear when patient is off stretcher. Physically safe environment: no spills, clutter or unnecessary equipment. Stretcher in lowest position, wheels locked, appropriate side rails in place. Provide visual cue, wrist band, yellow gown, etc. Monitor gait and stability. Monitor for mental status changes and reorient to person, place, and time. Review medications for side effects contributing to fall risk. Reinforce activity limits and safety measures with patient and family. Provide visual clues: red socks.

## 2020-12-17 NOTE — ED ADULT TRIAGE NOTE - HEIGHT IN INCHES
Patient c/o persistent diarrhea for 19 days. Patient saw primary today and Dr. Bean recommended patient speak with Dr. Marcelo about possibly stopping her Aromasin to see if that is the issues. Please call patient back to discuss further.    7

## 2020-12-17 NOTE — ED PROVIDER NOTE - CONSTITUTIONAL, MLM
normal... Weak appearing, awake, alert, oriented to person, place, time/situation and in no apparent distress. Mild pallor.

## 2020-12-17 NOTE — ED ADULT NURSE REASSESSMENT NOTE - NS ED NURSE REASSESS COMMENT FT1
pt. family updated on POC for admission, meal tray order for patient, pt. given PO fluids. pt. has no other complaints at this time.

## 2020-12-17 NOTE — H&P ADULT - ASSESSMENT
79M.  admitted 12/27/20.  presented to ED c/o maroon stools.  was also recently diagnosed w/ COVID-19.    GIB.  - HH stable.  continue to monitor.  - AB CT negative for diverticular bleed.  - hold ASA.  - Protonix gtt.  - GI.    moderate-to-severe COVID-19.  - room air SpO2 91%.  - monitor inflammatory markers.  - CXR:  infiltrates c/w COVID.  - supplemental O2 via NC.  - start:  remdesivir (ALT wnl;  GFR 37).  - start:  dexamethasone.  - DVT prophylaxis:  chemoprophylaxis deferred due to GIB.  will d/w GI.  would like to start as soon as able as patient is at high risk for clotting.  - ID.  - Pulmonology.    DVT prophylaxis.  - SCDs.    disposition.  - general medical vasquez.  - COVID isolation protocol.    communication.  - ED RN. 79M.  admitted 12/27/20.  presented to ED c/o maroon stools.  was also recently diagnosed w/ COVID-19.    GIB.  - HH stable.  continue to monitor.  - AB CT negative for diverticular bleed.  - hold ASA.  - Protonix gtt.  - GI.    moderate-to-severe COVID-19.  - room air SpO2 91%.  - monitor inflammatory markers.  - CXR:  infiltrates c/w COVID.  - supplemental O2 via NC.  - start:  remdesivir (ALT wnl;  GFR 37).  - start:  dexamethasone.  - DVT prophylaxis:  chemoprophylaxis deferred due to GIB.  will d/w GI.  would like to start as soon as able as patient is at high risk for clotting.  - ID.  - Pulmonology.    renal insufficiency.  - monitor BMP.  - hold Lasix.  - ubaldo IVFs.    DVT prophylaxis.  - SCDs.    disposition.  - general medical vasquez.  - COVID isolation protocol.    communication.  - ED RN.

## 2020-12-17 NOTE — ED ADULT NURSE NOTE - OBJECTIVE STATEMENT
pt. c/o cough x 8 days and bloody stools that started few days ago. Pt. denies dizziness, n/v, abd. pain. Pt. states he tested COVID+ approx 8 days ago, his wife was +COVID approx 2 weeks ago. pt. does not appear to be in respiratory distress.

## 2020-12-17 NOTE — ED PROVIDER NOTE - OBJECTIVE STATEMENT
78 y/o male with PMHx of gout, obesity, colon polyps, HLD, acute kidney injury, anxiety, renal calculi, nonrheumatic aortic valve stenosis s/p AVR, HTN, lithotripsy, b/l meniscus surgery presents to ED for bloody stool, syncope. Pt's wife tested positive for COVID 2 weeks ago, pt was tested 8 days ago and found to be COVID positive, asymptomatic at that time. Has been self isolating at home. x4-5 days, noticed dark stools with blood and progressive weakness. Some maroon stool today with mild SOB. Reports fever 3 days ago, none recently. Denies abdominal pain. No hx of GI bleed. Has regular colposcopies with Dr. Damian Rooney.

## 2020-12-17 NOTE — H&P ADULT - HISTORY OF PRESENT ILLNESS
CC:  Patient is a 79y old  Male who presents with a chief complaint of dark stools (17 Dec 2020 15:49)      HPI:  79M.  admitted 12/17/20.  presented to ED c/o maroon stools.  noted dark stools w/ blood.  a/w progressive weakness.  no prior hx of GIB.  patient takes ASA 81mg daily but DENIES use of other NSAIDS or anticoagulants.  no AB pain.    wife tested positive for COVID 2 weeks prior and patient also tested postive 8 days ago.  since, he has been self-isolating.  has had a fever and mild SOB.    PMHx:  severe AS-AVR;  obesity;  HTN;  HLD;  renal calculi;  MIRA;  colonic polyps;  gout.    ROS:      all other review of systems are negative unless indicated above.    PAST MEDICAL & SURGICAL HISTORY:  Gout    Obesity, unspecified obesity severity, unspecified obesity type    History of colon polyps    Hyperlipidemia, unspecified hyperlipidemia type    MIRA (acute kidney injury)    Anxiety    Renal calculi    Nonrheumatic aortic valve stenosis    Essential hypertension    S/P AVR (aortic valve replacement)  7/1/2016    History of lithotripsy    S/P knee surgery  bilateral torn meniscus        Allergies    No Known Allergies    Intolerances        Home Medications:  amLODIPine 5 mg oral tablet: 1 tab(s) orally once a day (in the morning) (22 Jul 2019 11:06)  aspirin 81 mg oral tablet: 1 tab(s) orally once a day (22 Jul 2019 11:06)  chondroitin 400 mg oral capsule: 2 cap(s) orally once a day (22 Jul 2019 11:06)  Fish Oil: 1500 milligram(s) orally (22 Jul 2019 11:06)  furosemide 20 mg oral tablet: 1 tab(s) orally once a day (in the morning) (22 Jul 2019 11:06)  metoprolol tartrate 100 mg oral tablet: 1 tab(s) orally once a day (in the morning) (22 Jul 2019 11:06)  multivitamin: 1 tab(s) orally once a day (22 Jul 2019 11:06)  potassium chloride 10 mEq oral tablet, extended release: 1 tab(s) orally once a day (in the morning) (22 Jul 2019 11:06)  simvastatin 80 mg oral tablet: 1 tab(s) orally once a day (at bedtime) (22 Jul 2019 11:06)  Vitamin B-12: 1 tab(s) orally once a day (22 Jul 2019 11:06)      Social History:  .  lives with wife.    FAMILY HISTORY:  Family history of essential hypertension (Sibling)    Family history of suicide (Father)    Family history of lung cancer (Mother)        Vital Signs Last 24 Hrs  T(C): 36.9 (17 Dec 2020 15:28), Max: 36.9 (17 Dec 2020 15:28)  T(F): 98.4 (17 Dec 2020 15:28), Max: 98.4 (17 Dec 2020 15:28)  HR: 98 (17 Dec 2020 17:26) (66 - 108)  BP: 125/67 (17 Dec 2020 15:28) (125/67 - 127/67)  BP(mean): 79 (17 Dec 2020 15:28) (79 - 79)  RR: 18 (17 Dec 2020 17:26) (15 - 18)  SpO2: 91% (17 Dec 2020 17:26) (91% - 97%)    Constitutional: NAD.   HEENT: PERRL, EOMI, MMM.  Neck: Soft and supple, No carotid bruit, No JVD  Respiratory: Breath sounds are clear bilaterally, No wheezing, rales or rhonchi  Cardiovascular: S1 and S2, regular rate and rhythm, no murmur, rub or gallop.  Gastrointestinal: Bowel Sounds present, soft, nontender, nondistended, no guarding, no rebound, no mass.  Extremities: No peripheral edema  Vascular: 2+ peripheral pulses  Neurological: A/O x 3, no focal deficits  Musculoskeletal: 5/5 strength b/l upper and lower extremities  Skin:  no visible rashes.                             13.0   6.40  )-----------( 139      ( 17 Dec 2020 12:12 )             38.9       PT/INR - ( 17 Dec 2020 12:12 )   PT: 14.2 sec;   INR: 1.24 ratio         PTT - ( 17 Dec 2020 12:12 )  PTT:30.7 sec    12-17    140  |  109<H>  |  46<H>  ----------------------------<  143<H>  4.8   |  20<L>  |  1.71<H>    eGFR if Non : 37: Interpretative comment eGFR if Non : 37: Interpretative comment   Ca    8.8      17 Dec 2020 12:12    TPro  6.5  /  Alb  2.4<L>  /  TBili  0.4  /  DBili  x   /  AST  72<H>  /  ALT  49  /  AlkPhos  99  12-17      LIVER FUNCTIONS - ( 17 Dec 2020 12:12 )  Alb: 2.4 g/dL / Pro: 6.5 gm/dL / ALK PHOS: 99 U/L / ALT: 49 U/L / AST: 72 U/L / GGT: x           < from: Xray Chest 1 View- PORTABLE-Routine (Xray Chest 1 View- PORTABLE-Routine .) (12.17.20 @ 13:12) >  IMPRESSION: Mid lateral infiltrates suggest Covid infection.    < end of copied text >  < from: CT Angio Abdomen and Pelvis w/ IV Cont (12.17.20 @ 17:08) >    IMPRESSION:  No site of active GI bleeding.  Bibasilar groundglass pulmonary opacities consistent with pneumonia.  Gallstones.    < end of copied text >

## 2020-12-18 DIAGNOSIS — N17.9 ACUTE KIDNEY FAILURE, UNSPECIFIED: ICD-10-CM

## 2020-12-18 DIAGNOSIS — I35.0 NONRHEUMATIC AORTIC (VALVE) STENOSIS: ICD-10-CM

## 2020-12-18 DIAGNOSIS — K92.2 GASTROINTESTINAL HEMORRHAGE, UNSPECIFIED: ICD-10-CM

## 2020-12-18 DIAGNOSIS — U07.1 COVID-19: ICD-10-CM

## 2020-12-18 DIAGNOSIS — J12.9 VIRAL PNEUMONIA, UNSPECIFIED: ICD-10-CM

## 2020-12-18 LAB
SARS-COV-2 IGG SERPL QL IA: NEGATIVE — SIGNIFICANT CHANGE UP
SARS-COV-2 IGM SERPL IA-ACNC: 0.46 INDEX — SIGNIFICANT CHANGE UP

## 2020-12-18 PROCEDURE — 99232 SBSQ HOSP IP/OBS MODERATE 35: CPT | Mod: CS

## 2020-12-18 PROCEDURE — 70450 CT HEAD/BRAIN W/O DYE: CPT | Mod: 26

## 2020-12-18 PROCEDURE — 99223 1ST HOSP IP/OBS HIGH 75: CPT | Mod: CS

## 2020-12-18 PROCEDURE — 93010 ELECTROCARDIOGRAM REPORT: CPT

## 2020-12-18 RX ORDER — DEXTROSE 50 % IN WATER 50 %
15 SYRINGE (ML) INTRAVENOUS ONCE
Refills: 0 | Status: DISCONTINUED | OUTPATIENT
Start: 2020-12-18 | End: 2020-12-21

## 2020-12-18 RX ORDER — GLUCAGON INJECTION, SOLUTION 0.5 MG/.1ML
1 INJECTION, SOLUTION SUBCUTANEOUS ONCE
Refills: 0 | Status: DISCONTINUED | OUTPATIENT
Start: 2020-12-18 | End: 2020-12-21

## 2020-12-18 RX ORDER — DEXTROSE 50 % IN WATER 50 %
12.5 SYRINGE (ML) INTRAVENOUS ONCE
Refills: 0 | Status: DISCONTINUED | OUTPATIENT
Start: 2020-12-18 | End: 2020-12-21

## 2020-12-18 RX ORDER — INSULIN LISPRO 100/ML
VIAL (ML) SUBCUTANEOUS
Refills: 0 | Status: DISCONTINUED | OUTPATIENT
Start: 2020-12-18 | End: 2020-12-21

## 2020-12-18 RX ORDER — SODIUM CHLORIDE 9 MG/ML
1000 INJECTION, SOLUTION INTRAVENOUS
Refills: 0 | Status: DISCONTINUED | OUTPATIENT
Start: 2020-12-18 | End: 2020-12-21

## 2020-12-18 RX ORDER — DEXTROSE 50 % IN WATER 50 %
25 SYRINGE (ML) INTRAVENOUS ONCE
Refills: 0 | Status: DISCONTINUED | OUTPATIENT
Start: 2020-12-18 | End: 2020-12-21

## 2020-12-18 RX ADMIN — Medication 6 MILLIGRAM(S): at 10:21

## 2020-12-18 RX ADMIN — PREGABALIN 1000 MICROGRAM(S): 225 CAPSULE ORAL at 10:21

## 2020-12-18 RX ADMIN — SODIUM CHLORIDE 83 MILLILITER(S): 9 INJECTION INTRAMUSCULAR; INTRAVENOUS; SUBCUTANEOUS at 10:21

## 2020-12-18 RX ADMIN — Medication 100 MILLIGRAM(S): at 10:20

## 2020-12-18 RX ADMIN — REMDESIVIR 500 MILLIGRAM(S): 5 INJECTION INTRAVENOUS at 10:19

## 2020-12-18 RX ADMIN — AMLODIPINE BESYLATE 5 MILLIGRAM(S): 2.5 TABLET ORAL at 21:07

## 2020-12-18 RX ADMIN — Medication 1 TABLET(S): at 10:19

## 2020-12-18 RX ADMIN — ATORVASTATIN CALCIUM 40 MILLIGRAM(S): 80 TABLET, FILM COATED ORAL at 21:07

## 2020-12-18 NOTE — CHART NOTE - NSCHARTNOTEFT_GEN_A_CORE
Called by RN to evaluate pt with confusion. Daughter is concerned that the patient is more confused today.    Vital Signs Last 24 Hrs  T(C): 37.1 (17 Dec 2020 20:19), Max: 37.3 (17 Dec 2020 18:19)  T(F): 98.7 (17 Dec 2020 20:19), Max: 99.2 (17 Dec 2020 18:19)  HR: 101 (17 Dec 2020 20:19) (66 - 112)  BP: 128/62 (17 Dec 2020 20:19) (111/67 - 140/61)  BP(mean): 73 (17 Dec 2020 18:19) (69 - 84)  RR: 20 (17 Dec 2020 20:19) (15 - 22)  SpO2: 96% (17 Dec 2020 18:19) (91% - 97%)    Gen: alert and oriented x 3, NAD  Cardiac: s1 s2 regular  Lungs: good air entry b/l  Abd: + BS, soft, NT, ND  Ext: no edema    AMS  -CT head negative for Called by RN to evaluate pt with confusion. Daughter is concerned that the patient is confused for the past 4 days.   Patient denies any complaints. Denies headache, dizziness, numbness, tingling or weakness.    Patient seen and examined at bedside.     Vital Signs Last 24 Hrs  T(C): 37.1 (17 Dec 2020 20:19), Max: 37.3 (17 Dec 2020 18:19)  T(F): 98.7 (17 Dec 2020 20:19), Max: 99.2 (17 Dec 2020 18:19)  HR: 101 (17 Dec 2020 20:19) (66 - 112)  BP: 128/62 (17 Dec 2020 20:19) (111/67 - 140/61)  BP(mean): 73 (17 Dec 2020 18:19) (69 - 84)  RR: 20 (17 Dec 2020 20:19) (15 - 22)  SpO2: 96% (17 Dec 2020 18:19) (91% - 97%)    Gen: awake and alert in NAD.   Cardiac: s1 s2 regular  Lungs: good air entry b/l  Abd: + BS, soft, NT, ND  Ext: no edema  Neuro: A & O x 3. Strength 5/5 throughout. Sensation intact.     80 y/o male admitted with GI bleed and COVID-19 infection. Daughter is concerned about possible confusion. Pt is currently alert and oriented x 3. No focal neuro deficits. Will obtain STAT CT head to r/o acute CVA.     CT head is negative     Will continue to monitor

## 2020-12-18 NOTE — PHYSICAL THERAPY INITIAL EVALUATION ADULT - GENERAL OBSERVATIONS, REHAB EVAL
Pt was found lying in bed, pt on COVID isolation precs, Pt is pleasant and willing to participate in PT

## 2020-12-18 NOTE — PHYSICAL THERAPY INITIAL EVALUATION ADULT - PERTINENT HX OF CURRENT PROBLEM, REHAB EVAL
Pt admitted on 12/17 for evaluation of dark stools, weakness and cough productive of dark sputum; the patient's spouse was recently hospitalized for treatment of COVID-19 infection and he was also tested and found to be positive for COVID-19. Upon admission he was hypoxic to 91%. Notes mild intermittent fever.

## 2020-12-18 NOTE — PROGRESS NOTE ADULT - ASSESSMENT
79M.  admitted 12/27/20.  presented to ED c/o maroon stools.  was also recently diagnosed w/ COVID-19.    GIB.  - HH stable.  continue to monitor.  - AB CT negative for diverticular bleed.  - hold ASA.  - Protonix gtt.  - GI.    moderate-to-severe COVID-19.  - room air SpO2 91%.  - monitor inflammatory markers.  - CXR:  infiltrates c/w COVID.  - supplemental O2 via NC.  - 12/17 started:  remdesivir (ALT wnl;  GFR 37).  - 12/17 started:  dexamethasone.  - DVT prophylaxis:  chemoprophylaxis deferred due to GIB.    - ID.  - Pulmonology.    renal insufficiency.  - monitor BMP.  - hold Lasix.  - ubaldo IVFs.    DVT prophylaxis.  - SCDs.    disposition.  - general medical vasquez.  - COVID isolation protocol.    communication.  - ED RN. 79M.  admitted 12/27/20.  presented to ED c/o maroon stools.  was also recently diagnosed w/ COVID-19.    GIB.  - HH stable.  continue to monitor.  - AB CT negative for diverticular bleed.  - hold ASA.  - Protonix gtt.  - GI.    moderate-to-severe COVID-19.  - room air SpO2 91%.  - monitor inflammatory markers.  - CXR:  infiltrates c/w COVID.  - supplemental O2 via NC.  - 12/17 started:  remdesivir (ALT wnl;  GFR 37).  - 12/17 started:  dexamethasone.  - DVT prophylaxis:  chemoprophylaxis deferred due to GIB.    - ID.  - Pulmonology.    renal insufficiency.  - Cr improving.  - monitor BMP.  - hold Lasix.  - ubaldo IVFs.    hyperglycemia.  - r/o new onset type 2 DM.  - exacerbated by dexamethasone.  - no prior documentation of hx DM.  - A1C.  - correction insulin coverage.    DVT prophylaxis.  - SCDs.    disposition.  - general medical vasquez.  - COVID isolation protocol.    communication.  - ED RN. 79M.  admitted 12/27/20.  presented to ED c/o maroon stools.  was also recently diagnosed w/ COVID-19.    GIB.  - HH stable.  continue to monitor.  - AB CT negative for diverticular bleed.  - hold ASA.  - Protonix gtt.  - GI.    moderate-to-severe COVID-19.  - room air SpO2 91%.  - monitor inflammatory markers.  - CXR:  infiltrates c/w COVID.  - supplemental O2 via NC.  - 12/17 started:  remdesivir (ALT wnl;  GFR 37).  - 12/17 started:  dexamethasone.  - DVT prophylaxis:  chemoprophylaxis deferred due to GIB.    - ID.  - Pulmonology.    renal insufficiency.  - Cr improving.  - monitor BMP.  - hold Lasix.  - ubaldo IVFs.    hyperglycemia.  - r/o new onset type 2 DM.  - exacerbated by dexamethasone.  - no prior documentation of hx DM.  - A1C.  - correction insulin coverage.    DVT prophylaxis.  - SCDs.    disposition.  - general medical vasquez.  - COVID isolation protocol.    communication.  - RN.

## 2020-12-18 NOTE — CONSULT NOTE ADULT - ASSESSMENT
Dark stools suspect with recent use of nsaids is suggestive of an upper gi source    d/c nsaids  protonix drip\  hgb check daily  clears  ct angio abdomen to rule out a rt colon diverticular bleed  id consult  pulm consult (cough dark sputum)        start phone call 3:32 pm  end phone call 3:53 pm  length visit phone 21 minutes. 
79Male with past medical history gout, obesity, hyperlipidemia, anxiety, hypertension, nephrolithiasis, aortic stenosis s/p valve replacement, arthritis admitted on 12/17 for evaluation of dark stools, weakness and cough productive of dark sputum; the patient was recently hospitalized for treatment of COVID-19 infection and he was also tested and found to be positive for COVID-19. Upon admission he was hypoxic to 91%. Notes mild intermittent fever. No recent travel.     1. Patient admitted with COVID-19 infection superimposed on viral pneumonia; also noted with GI bleed  - follow up cultures   - serial cbc and monitor temperature   - oxygen and nebs as needed   - iv hydration and supportive care   - reviewed prior medical records to evaluate for resistant or atypical pathogens   - agree with dexamethasone as ordered  - on remdesivir and reviewed side effects with patient and agrees to continue with medication  - monitor renal and hepatic function while on remdesivir  - no role for convalescent plasma at this time due to duration of illness  - will observe off antibiotics  - continue isolation   - GI bleed evaluation  2. other issues; per medicine
Gi bleeding/maroon dark colored stools  bun elevated, recent nsaids- ? upper gi source   hx diverticulosis and maroon colored stool ? diverticular bleeding, ct angio abd neg for active bleeding but calcif plaque abd vessel    protonix drip  clears  hgb / hct check daily  possible scope -egd / colonoscopy pending status but holding for now since pt has oxygen desaturation to 91% room air with covid infection  stop nsaids, hold anticoagulation for now given gi bleed  no report of any further gi bleeding today  covid pos: on remdesivir, steroids, oxygen, nebs   
O2 as needed, remdesivir, decadron.  Supportive measures.     On protonix drip.

## 2020-12-18 NOTE — CONSULT NOTE ADULT - SUBJECTIVE AND OBJECTIVE BOX
This was conducted as a telephone visit       HPI:  79 yr old male with covid infection who presents with dark stools for 3 days    The pt's wife was diagnosed with covid after which the pt beginning 8 days ago developed fatigue and slept alot.  was coughing dark colored sputum.  says he used advil -several tabs every day to relieve cough and illness in past one week  began to note dark bloody stools in past 3-4 days with lightheadedness  his last bm was early today but seems to have lessened from the three bm's per day he was experiencing   hgb stable   no chest pain  no shortness of breath but b/l infiltrates in lungs noted coinciding with covid active infection  no heartburn or dysphagia  In 2019 the pt also noted to have dark stools and was evaluated with egd and had erosive gastritis/duodenitis h pylori pos and treated   the pt had a colonoscopy in 2017 with diverticulosis and single cecal polyp removed.  the pt has no abd pain  no n/v  pos fatigue  had fever 4-5 days ago but resolved.     PAST MEDICAL & SURGICAL HISTORY:  Gout    Obesity, unspecified obesity severity, unspecified obesity type    History of colon polyps    Hyperlipidemia, unspecified hyperlipidemia type    MIRA (acute kidney injury)    Anxiety    Renal calculi    Nonrheumatic aortic valve stenosis    Essential hypertension    S/P AVR (aortic valve replacement)  7/1/2016    History of lithotripsy    S/P knee surgery  bilateral torn meniscus        Allergies    No Known Allergies    Intolerances        MEDICATIONS  (STANDING):    MEDICATIONS  (PRN):      FAMILY HISTORY:  Family history of essential hypertension (Sibling)    Family history of suicide (Father)    Family history of lung cancer (Mother)        Social History:  no tobacco and no etoh    REVIEW OF SYSTEMS      General:	\fever 5 days ago resolved.     Skin/Breast: No jaundice or rash   		  ENMT: denies sore throat or thrush    Respiratory and Thorax: Denies dyspnea or cough or shortness of breath  	  Cardiovascular: Denies chest pain or palpitations 	    Gastrointestinal: Denies jaundice or pruritis    Genitourinary: Denies dysuria or hematuria	    Musculoskeletal: Denies muscular pain or swelling	    Neurological: Denies confusion or tremor	  Endocrine:	Denies polyphagia or polyuria    See above hx otherwise neg for any major organ systems    PHYSICAL EXAM:    Vital Signs Last 24 Hrs  T(C): 36.9 (17 Dec 2020 15:28), Max: 36.9 (17 Dec 2020 15:28)  T(F): 98.4 (17 Dec 2020 15:28), Max: 98.4 (17 Dec 2020 15:28)  HR: 108 (17 Dec 2020 15:28) (66 - 108)  BP: 125/67 (17 Dec 2020 15:28) (125/67 - 127/67)  BP(mean): 79 (17 Dec 2020 15:28) (79 - 79)  RR: 17 (17 Dec 2020 15:28) (15 - 17)  SpO2: 97% (17 Dec 2020 15:28) (97% - 97%)    Constitutional: no acute distress    lungs no wheezing  heart no palpitations  abd soft nontender  ext no edema  skin no rash or jaundice  neuro alert and oriented x 3      Date/Time:12-17 @ 12:12    Albumin: 2.4  ALT/SGPT: 49  Alk Phos: 99  AST/SGOT: 72  Bilirubin Direct: --  Bilirubin Total: 0.4  Ca: 8.8  eGFR : 43  eGFR Non-: 37  Lipase: --  Amylase: --  INR: 1.24  PTT: --      12-17    140  |  109<H>  |  46<H>  ----------------------------<  143<H>  4.8   |  20<L>  |  1.71<H>    Ca    8.8      17 Dec 2020 12:12    TPro  6.5  /  Alb  2.4<L>  /  TBili  0.4  /  DBili  x   /  AST  72<H>  /  ALT  49  /  AlkPhos  99  12-17                            13.0   6.40  )-----------( 139      ( 17 Dec 2020 12:12 )             38.9   
  HPI:  79Male with past medical history gout, obesity, hyperlipidemia, anxiety, hypertension, nephrolithiasis, aortic stenosis s/p valve replacement, arthritis admitted on 12/17 for evaluation of dark stools, weakness and cough productive of dark sputum; the patient was recently hospitalized for treatment of COVID-19 infection and he was also tested and found to be positive for COVID-19. Upon admission he was hypoxic to 91%. Notes mild intermittent fever. No recent travel.       PMHx:  severe AS-AVR;  obesity;  HTN;  HLD;  renal calculi;  MIRA;  colonic polyps;  gout.  PAST MEDICAL & SURGICAL HISTORY:  Gout  Obesity, unspecified obesity severity, unspecified obesity type  History of colon polyps  Hyperlipidemia, unspecified hyperlipidemia type  MIRA (acute kidney injury)  Anxiety  Renal calculi  Nonrheumatic aortic valve stenosis  Essential hypertension  S/P AVR (aortic valve replacement)  7/1/2016  History of lithotripsy  S/P knee surgery  bilateral torn meniscus        PMH: as above  PSH: as above  Meds: per reconciliation sheet, noted below  MEDICATIONS  (STANDING):  amLODIPine   Tablet 5 milliGRAM(s) Oral at bedtime  atorvastatin 40 milliGRAM(s) Oral at bedtime  cyanocobalamin 1000 MICROGram(s) Oral daily  dexAMETHasone  Injectable 6 milliGRAM(s) IV Push daily  metoprolol tartrate 100 milliGRAM(s) Oral daily  multivitamin 1 Tablet(s) Oral daily  pantoprazole Infusion 8 mG/Hr (10 mL/Hr) IV Continuous <Continuous>  remdesivir  IVPB   IV Intermittent   remdesivir  IVPB 100 milliGRAM(s) IV Intermittent every 24 hours  sodium chloride 0.9%. 1000 milliLiter(s) (83 mL/Hr) IV Continuous <Continuous>    MEDICATIONS  (PRN):    Allergies    No Known Allergies    Intolerances      Social: no smoking, no alcohol, no illegal drugs; no recent travel, no exposure to TB  FAMILY HISTORY:  Family history of essential hypertension (Sibling)    Family history of suicide (Father)    Family history of lung cancer (Mother)       ROS: the patient has no chills, no HA, no dizziness, no sore throat, no blurry vision, no CP, no palpitations, no abdominal pain, no diarrhea, no N/V, no dysuria, no leg pain, no claudication, no rash, no joint aches, no rectal pain, no night sweats  All other systems reviewed and are negative    Vital Signs Last 24 Hrs  T(C): 36.7 (18 Dec 2020 08:37), Max: 37.3 (17 Dec 2020 18:19)  T(F): 98 (18 Dec 2020 08:37), Max: 99.2 (17 Dec 2020 18:19)  HR: 92 (18 Dec 2020 08:37) (92 - 112)  BP: 136/68 (18 Dec 2020 08:37) (115/56 - 140/61)  BP(mean): 73 (17 Dec 2020 18:19) (69 - 84)  RR: 20 (18 Dec 2020 08:37) (17 - 20)  SpO2: 96% (18 Dec 2020 08:37) (91% - 97%)  Daily     Daily     PE:    Constitutional: frail looking  HEENT: NC/AT, EOMI, PERRLA, conjunctivae clear; ears and nose atraumatic; pharynx clear  Neck: supple; thyroid not palpable  Back: no tenderness  Respiratory: respiratory effort normal; clear to auscultation  Cardiovascular: S1S2 regular, no murmurs  Abdomen: soft, not tender, not distended, positive BS; no liver or spleen organomegaly  Genitourinary: no suprapubic tenderness  Musculoskeletal: no muscle tenderness, no joint swelling or tenderness  Neurological/ Psychiatric: AxOx3, judgement and insight normal;  moving all extremities  Skin: no rashes; no palpable lesions    Labs: all available labs reviewed                        10.0   4.70  )-----------( 141      ( 18 Dec 2020 10:12 )             29.0     12-18    145  |  115<H>  |  45<H>  ----------------------------<  286<H>  3.5   |  22  |  1.35<H>    Ca    9.0      18 Dec 2020 10:12    TPro  5.6<L>  /  Alb  2.3<L>  /  TBili  0.4  /  DBili  0.1  /  AST  40<H>  /  ALT  33  /  AlkPhos  74  12-18     LIVER FUNCTIONS - ( 18 Dec 2020 10:12 )  Alb: 2.3 g/dL / Pro: 5.6 gm/dL / ALK PHOS: 74 U/L / ALT: 33 U/L / AST: 40 U/L / GGT: x           COVID-19 PCR . (12.17.20 @ 12:12)    COVID-19 PCR: Detected: You can help in the fight against COVID-19. [facility] may contact  you to see if you are interested in voluntarily participating in one of  our clinical trials.  Testing is performed using polymerase chain reaction (PCR) or  transcription mediated amplification (TMA). This COVID-19 (SARS-CoV-2)  nucleic acid amplification test was validated by [facility] and is  in use under the FDA Emergency Use Authorization (EUA) for clinical labs  CLIA-certified to perform high complexity testing. Test results should be  correlated with clinical presentation, patient history, and epidemiology.          Radiology: all available radiological tests reviewed  < from: Xray Chest 1 View- PORTABLE-Routine (Xray Chest 1 View- PORTABLE-Routine .) (12.17.20 @ 13:12) >    EXAM:  XR CHEST PORTABLE ROUTINE 1V                            PROCEDURE DATE:  12/17/2020          INTERPRETATION:  AP erect chest on December 17, 2020 at 12:45 PM. Patient has a GI bleed.    Heart magnified by technique. Horizontal plate and screw on the right similar to CAT scan of August 9, 2016.    There are some scattered midlung field infiltrates laterally that suggest Covid infection.    IMPRESSION: Mid lateral infiltrates suggest Covid infection.      < end of copied text >    Advanced directives addressed: full resuscitation
last bm yesterday   dark maroon colored stool reported by nursing staff yesterday  no abd pain       Allergies    No Known Allergies    Intolerances        MEDICATIONS  (STANDING):  amLODIPine   Tablet 5 milliGRAM(s) Oral at bedtime  atorvastatin 40 milliGRAM(s) Oral at bedtime  cyanocobalamin 1000 MICROGram(s) Oral daily  dexAMETHasone  Injectable 6 milliGRAM(s) IV Push daily  dextrose 40% Gel 15 Gram(s) Oral once  dextrose 5%. 1000 milliLiter(s) (50 mL/Hr) IV Continuous <Continuous>  dextrose 5%. 1000 milliLiter(s) (100 mL/Hr) IV Continuous <Continuous>  dextrose 50% Injectable 25 Gram(s) IV Push once  dextrose 50% Injectable 12.5 Gram(s) IV Push once  dextrose 50% Injectable 25 Gram(s) IV Push once  glucagon  Injectable 1 milliGRAM(s) IntraMuscular once  insulin lispro (ADMELOG) corrective regimen sliding scale   SubCutaneous three times a day before meals  metoprolol tartrate 100 milliGRAM(s) Oral daily  multivitamin 1 Tablet(s) Oral daily  pantoprazole Infusion 8 mG/Hr (10 mL/Hr) IV Continuous <Continuous>  remdesivir  IVPB   IV Intermittent   remdesivir  IVPB 100 milliGRAM(s) IV Intermittent every 24 hours  sodium chloride 0.9%. 1000 milliLiter(s) (83 mL/Hr) IV Continuous <Continuous>    MEDICATIONS  (PRN):      REVIEW OF SYSTEMS      General:	No fever or chills    Skin/Breast: No jaundice or rash   		  ENMT: denies sore throat or thrush    Respiratory and Thorax: Denies dyspnea or cough or shortness of breath  	  Cardiovascular: Denies chest pain or palpitations 	    Gastrointestinal: Denies jaundice or pruritis    Genitourinary: Denies dysuria or hematuria	    Musculoskeletal: Denies muscular pain or swelling	    Neurological: Denies confusion or tremor	    Hematology/Lymphatics: Denies easy bruising	    Endocrine:	Denies polyphagia or polyuria    See above hx otherwise neg for any major organ systems    PHYSICAL EXAM:    Vital Signs Last 24 Hrs  T(C): 36.1 (18 Dec 2020 16:28), Max: 37.3 (17 Dec 2020 18:19)  T(F): 96.9 (18 Dec 2020 16:28), Max: 99.2 (17 Dec 2020 18:19)  HR: 82 (18 Dec 2020 16:28) (82 - 112)  BP: 149/66 (18 Dec 2020 16:28) (125/72 - 149/66)  BP(mean): 73 (17 Dec 2020 18:19) (73 - 84)  RR: 19 (18 Dec 2020 16:28) (18 - 20)  SpO2: 98% (18 Dec 2020 16:28) (91% - 98%)    Constitutional: no acute distress    ENMT: NC/AT scl anicteric opm pink no lesions     Neck: supple. No jvd or LN    Respiratory: Clear     Cardiovascular: RRR s1s2     Gastrointestinal: Pos bs , soft , not tender, no hepatosplenomegaly,  no mass      Back: No CVA tenderness    Extremities: NO cce     Neurological: Alert and oriented x 3     Skin: No rash or jaundice    Date/Time:12-18 @ 10:12    ALT/SGPT: 33  Albumin: 2.3  AST/SGOT: 40  Bilirubin Direct: 0.1  Bilirubin Total: 0.4  Ca: 9.0  eGFR : 57  eGFR Non-: 49  Lipase: --  Amylase: --  INR: --  PTT: --        Date/Time:12-17 @ 19:45    ALT/SGPT: 35  Albumin: 2.4  AST/SGOT: 46  Bilirubin Direct: 0.1  Bilirubin Total: 0.3  Ca: --  eGFR : 47  eGFR Non-: 41  Lipase: --  Amylase: --  INR: --  PTT: --        Date/Time:12-17 @ 12:12    ALT/SGPT: 49  Albumin: 2.4  AST/SGOT: 72  Bilirubin Direct: --  Bilirubin Total: 0.4  Ca: 8.8  eGFR : 43  eGFR Non-: 37  Lipase: --  Amylase: --  INR: 1.24  PTT: --            12-18    145  |  115<H>  |  45<H>  ----------------------------<  286<H>  3.5   |  22  |  1.35<H>    Ca    9.0      18 Dec 2020 10:12    TPro  5.6<L>  /  Alb  2.3<L>  /  TBili  0.4  /  DBili  0.1  /  AST  40<H>  /  ALT  33  /  AlkPhos  74  12-18                            10.0   4.70  )-----------( 141      ( 18 Dec 2020 10:12 )             29.0       
  HPI:  CC:  Patient is a 79y old  Male who presents with a chief complaint of dark stools (17 Dec 2020 15:49)      HPI:  79M.  admitted 12/17/20.  presented to ED c/o maroon stools.  noted dark stools w/ blood.  a/w progressive weakness.  no prior hx of GIB.  patient takes ASA 81mg daily but DENIES use of other NSAIDS or anticoagulants.  no AB pain.    wife tested positive for COVID 2 weeks prior and patient also tested postive 8 days ago.  since, he has been self-isolating.  has had a fever and mild SOB.      12/18: in bed, no distress.  denies coughing or SOB.     PMHx:  severe AS-AVR;  obesity;  HTN;  HLD;  renal calculi;  MIRA;  colonic polyps;  gout.    ROS:      all other review of systems are negative unless indicated above.    PAST MEDICAL & SURGICAL HISTORY:  Gout    Obesity, unspecified obesity severity, unspecified obesity type    History of colon polyps    Hyperlipidemia, unspecified hyperlipidemia type    MIRA (acute kidney injury)    Anxiety    Renal calculi    Nonrheumatic aortic valve stenosis    Essential hypertension    S/P AVR (aortic valve replacement)  7/1/2016    History of lithotripsy    S/P knee surgery  bilateral torn meniscus        Allergies    No Known Allergies    Intolerances        Home Medications:  amLODIPine 5 mg oral tablet: 1 tab(s) orally once a day (in the morning) (22 Jul 2019 11:06)  aspirin 81 mg oral tablet: 1 tab(s) orally once a day (22 Jul 2019 11:06)  chondroitin 400 mg oral capsule: 2 cap(s) orally once a day (22 Jul 2019 11:06)  Fish Oil: 1500 milligram(s) orally (22 Jul 2019 11:06)  furosemide 20 mg oral tablet: 1 tab(s) orally once a day (in the morning) (22 Jul 2019 11:06)  metoprolol tartrate 100 mg oral tablet: 1 tab(s) orally once a day (in the morning) (22 Jul 2019 11:06)  multivitamin: 1 tab(s) orally once a day (22 Jul 2019 11:06)  potassium chloride 10 mEq oral tablet, extended release: 1 tab(s) orally once a day (in the morning) (22 Jul 2019 11:06)  simvastatin 80 mg oral tablet: 1 tab(s) orally once a day (at bedtime) (22 Jul 2019 11:06)  Vitamin B-12: 1 tab(s) orally once a day (22 Jul 2019 11:06)      Social History:  .  lives with wife.    FAMILY HISTORY:  Family history of essential hypertension (Sibling)    Family history of suicide (Father)    Family history of lung cancer (Mother)        Vital Signs Last 24 Hrs  T(C): 36.9 (17 Dec 2020 15:28), Max: 36.9 (17 Dec 2020 15:28)  T(F): 98.4 (17 Dec 2020 15:28), Max: 98.4 (17 Dec 2020 15:28)  HR: 98 (17 Dec 2020 17:26) (66 - 108)  BP: 125/67 (17 Dec 2020 15:28) (125/67 - 127/67)  BP(mean): 79 (17 Dec 2020 15:28) (79 - 79)  RR: 18 (17 Dec 2020 17:26) (15 - 18)  SpO2: 91% (17 Dec 2020 17:26) (91% - 97%)    Constitutional: NAD.   HEENT: PERRL, EOMI, MMM.  Neck: Soft and supple, No carotid bruit, No JVD  Respiratory: Breath sounds are clear bilaterally, No wheezing, rales or rhonchi  Cardiovascular: S1 and S2, regular rate and rhythm, no murmur, rub or gallop.  Gastrointestinal: Bowel Sounds present, soft, nontender, nondistended, no guarding, no rebound, no mass.  Extremities: No peripheral edema  Vascular: 2+ peripheral pulses  Neurological: A/O x 3, no focal deficits  Musculoskeletal: 5/5 strength b/l upper and lower extremities  Skin:  no visible rashes.                             13.0   6.40  )-----------( 139      ( 17 Dec 2020 12:12 )             38.9       PT/INR - ( 17 Dec 2020 12:12 )   PT: 14.2 sec;   INR: 1.24 ratio         PTT - ( 17 Dec 2020 12:12 )  PTT:30.7 sec    12-17    140  |  109<H>  |  46<H>  ----------------------------<  143<H>  4.8   |  20<L>  |  1.71<H>    eGFR if Non : 37: Interpretative comment eGFR if Non : 37: Interpretative comment   Ca    8.8      17 Dec 2020 12:12    TPro  6.5  /  Alb  2.4<L>  /  TBili  0.4  /  DBili  x   /  AST  72<H>  /  ALT  49  /  AlkPhos  99  12-17      LIVER FUNCTIONS - ( 17 Dec 2020 12:12 )  Alb: 2.4 g/dL / Pro: 6.5 gm/dL / ALK PHOS: 99 U/L / ALT: 49 U/L / AST: 72 U/L / GGT: x           < from: Xray Chest 1 View- PORTABLE-Routine (Xray Chest 1 View- PORTABLE-Routine .) (12.17.20 @ 13:12) >  IMPRESSION: Mid lateral infiltrates suggest Covid infection.    < end of copied text >  < from: CT Angio Abdomen and Pelvis w/ IV Cont (12.17.20 @ 17:08) >    IMPRESSION:  No site of active GI bleeding.  Bibasilar groundglass pulmonary opacities consistent with pneumonia.  Gallstones.    < end of copied text >           (17 Dec 2020 18:03)      PAST MEDICAL & SURGICAL HISTORY:  Gout    Obesity, unspecified obesity severity, unspecified obesity type    History of colon polyps    Hyperlipidemia, unspecified hyperlipidemia type    MIRA (acute kidney injury)    Anxiety    Renal calculi    Nonrheumatic aortic valve stenosis    Essential hypertension    S/P AVR (aortic valve replacement)  7/1/2016    History of lithotripsy    S/P knee surgery  bilateral torn meniscus        MEDICATIONS  (STANDING):  amLODIPine   Tablet 5 milliGRAM(s) Oral at bedtime  atorvastatin 40 milliGRAM(s) Oral at bedtime  cyanocobalamin 1000 MICROGram(s) Oral daily  dexAMETHasone  Injectable 6 milliGRAM(s) IV Push daily  metoprolol tartrate 100 milliGRAM(s) Oral daily  multivitamin 1 Tablet(s) Oral daily  pantoprazole Infusion 8 mG/Hr (10 mL/Hr) IV Continuous <Continuous>  remdesivir  IVPB   IV Intermittent   remdesivir  IVPB 100 milliGRAM(s) IV Intermittent every 24 hours  sodium chloride 0.9%. 1000 milliLiter(s) (83 mL/Hr) IV Continuous <Continuous>    MEDICATIONS  (PRN):      Allergies    No Known Allergies    Intolerances        SOCIAL HISTORY: Denies tobacco, etoh abuse or illicit drug use    FAMILY HISTORY:  Family history of essential hypertension (Sibling)    Family history of suicide (Father)    Family history of lung cancer (Mother)        Vital Signs Last 24 Hrs  T(C): 36.7 (18 Dec 2020 08:37), Max: 37.3 (17 Dec 2020 18:19)  T(F): 98 (18 Dec 2020 08:37), Max: 99.2 (17 Dec 2020 18:19)  HR: 92 (18 Dec 2020 08:37) (66 - 112)  BP: 136/68 (18 Dec 2020 08:37) (111/67 - 140/61)  BP(mean): 73 (17 Dec 2020 18:19) (69 - 84)  RR: 20 (18 Dec 2020 08:37) (15 - 22)  SpO2: 96% (18 Dec 2020 08:37) (91% - 97%)    REVIEW OF SYSTEMS:    CONSTITUTIONAL:  As per HPI.  HEENT:  Eyes:  No diplopia or blurred vision. ENT:  No earache, sore throat or runny nose.  CARDIOVASCULAR:  No pressure, squeezing, tightness, heaviness or aching about the chest, neck, axilla or epigastrium.  RESPIRATORY:  See above.   GASTROINTESTINAL:  No nausea, vomiting or diarrhea.  GENITOURINARY:  No dysuria, frequency or urgency.  MUSCULOSKELETAL:  As per HPI.  SKIN:  No change in skin, hair or nails.  NEUROLOGIC:  No paresthesias, fasciculations, seizures or weakness.  PSYCHIATRIC:  No disorder of thought or mood.  ENDOCRINE:  No heat or cold intolerance, polyuria or polydipsia.  HEMATOLOGICAL:  No easy bruising or bleedings:  .     PHYSICAL EXAMINATION:    GENERAL APPEARANCE:  Pt. is not currently dyspneic, in no distress. Pt. is alert, oriented, and pleasant.  HEENT:  Pupils are normal and react normally. No icterus. Mucous membranes well colored.  NECK:  Supple. No lymphadenopathy. Jugular venous pressure not elevated. Carotids equal.   HEART:   The cardiac impulse has a normal quality. Regular. Normal S1 and S2. There are no murmurs, rubs or gallops noted  CHEST:  Chest is clear to auscultation. Normal respiratory effort.  ABDOMEN:  Soft and nontender.   EXTREMITIES:  There is no cyanosis, clubbing or edema.   SKIN:  No rash or significant lesions are noted.  Neuro: Alert, awake, and O x 3.      LABS:                        13.0   6.40  )-----------( 139      ( 17 Dec 2020 12:12 )             38.9     12-17    x   |  x   |  x   ----------------------------<  x   x    |  x   |  1.58<H>    Ca    8.8      17 Dec 2020 12:12    TPro  5.7<L>  /  Alb  2.4<L>  /  TBili  0.3  /  DBili  0.1  /  AST  46<H>  /  ALT  35  /  AlkPhos  77  12-17    LIVER FUNCTIONS - ( 17 Dec 2020 19:45 )  Alb: 2.4 g/dL / Pro: 5.7 gm/dL / ALK PHOS: 77 U/L / ALT: 35 U/L / AST: 46 U/L / GGT: x           PT/INR - ( 17 Dec 2020 12:12 )   PT: 14.2 sec;   INR: 1.24 ratio         PTT - ( 17 Dec 2020 12:12 )  PTT:30.7 sec            RADIOLOGY & ADDITIONAL STUDIES:       EXAM:  XR CHEST PORTABLE ROUTINE 1V                            PROCEDURE DATE:  12/17/2020          INTERPRETATION:  AP erect chest on December 17, 2020 at 12:45 PM. Patient has a GI bleed.    Heart magnified by technique. Horizontal plate and screw on the right similar to CAT scan of August 9, 2016.    There are some scattered midlung field infiltrates laterally that suggest Covid infection.    IMPRESSION: Mid lateral infiltrates suggest Covid infection.          EXAM:  CT ANGIO ABD PELV (W)AW IC                            PROCEDURE DATE:  12/17/2020          INTERPRETATION:  CLINICAL INFORMATION: 79-year-old man, Covid positive, with dark stools for 3 days    COMPARISON: None.    PROCEDURE:  CT of the Abdomen and Pelvis was performed with and without intravenous contrast.  Precontrast, Arterial and Delayed phases were performed.  Intravenous contrast: 90 ml Omnipaque 350. 10 ml discarded.  Oral contrast: None.  Sagittal and coronal reformats were performed.    FINDINGS:  LOWER CHEST: Scattered peripheral groundglass opacities consistent with pneumonia which may be secondary to Covid infection    LIVER: Within normal limits.  BILE DUCTS: Normal caliber.  GALLBLADDER: Gallstones.  SPLEEN: Within normal limits.  PANCREAS: Within normal limits.  ADRENALS: Within normal limits.  KIDNEYS/URETERS: Within normal limits.    BLADDER: Within normal limits.  REPRODUCTIVE ORGANS: Mildly enlarged prostate    BOWEL: No bowel obstruction. Appendix normal. No site of active bleeding identified.  PERITONEUM: No ascites.  VESSELS: No aortic aneurysm. Patent aortic branches. Calcified plaque near the origin of the celiac axis and SMA.  RETROPERITONEUM/LYMPH NODES: No lymphadenopathy.  ABDOMINAL WALL: Within normal limits.  BONES: Degenerative change.    IMPRESSION:  No site of active GI bleeding.  Bibasilar groundglass pulmonary opacities consistent with pneumonia.  Gallstones.

## 2020-12-18 NOTE — PHYSICAL THERAPY INITIAL EVALUATION ADULT - LIVES WITH, PROFILE
Pt has 4 steps to enter house, a landing , then 5 steps down, again pt has 4 steps with rails to the bedroom/spouse

## 2020-12-19 LAB
A1C WITH ESTIMATED AVERAGE GLUCOSE RESULT: 6.3 % — HIGH (ref 4–5.6)
ALBUMIN SERPL ELPH-MCNC: 2.2 G/DL — LOW (ref 3.3–5)
ALP SERPL-CCNC: 82 U/L — SIGNIFICANT CHANGE UP (ref 40–120)
ALT FLD-CCNC: 42 U/L — SIGNIFICANT CHANGE UP (ref 12–78)
ANION GAP SERPL CALC-SCNC: 6 MMOL/L — SIGNIFICANT CHANGE UP (ref 5–17)
AST SERPL-CCNC: 52 U/L — HIGH (ref 15–37)
BILIRUB DIRECT SERPL-MCNC: 0.1 MG/DL — SIGNIFICANT CHANGE UP (ref 0–0.2)
BILIRUB INDIRECT FLD-MCNC: 0.2 MG/DL — SIGNIFICANT CHANGE UP (ref 0.2–1)
BILIRUB SERPL-MCNC: 0.3 MG/DL — SIGNIFICANT CHANGE UP (ref 0.2–1.2)
BUN SERPL-MCNC: 35 MG/DL — HIGH (ref 7–23)
CALCIUM SERPL-MCNC: 8.9 MG/DL — SIGNIFICANT CHANGE UP (ref 8.5–10.1)
CHLORIDE SERPL-SCNC: 117 MMOL/L — HIGH (ref 96–108)
CO2 SERPL-SCNC: 21 MMOL/L — LOW (ref 22–31)
CREAT SERPL-MCNC: 1.1 MG/DL — SIGNIFICANT CHANGE UP (ref 0.5–1.3)
CRP SERPL-MCNC: 2.51 MG/DL — HIGH (ref 0–0.4)
D DIMER BLD IA.RAPID-MCNC: 425 NG/ML DDU — HIGH
ESTIMATED AVERAGE GLUCOSE: 134 MG/DL — HIGH (ref 68–114)
FERRITIN SERPL-MCNC: 986 NG/ML — HIGH (ref 30–400)
GLUCOSE SERPL-MCNC: 163 MG/DL — HIGH (ref 70–99)
HCT VFR BLD CALC: 27.3 % — LOW (ref 39–50)
HGB BLD-MCNC: 9.2 G/DL — LOW (ref 13–17)
MCHC RBC-ENTMCNC: 30.5 PG — SIGNIFICANT CHANGE UP (ref 27–34)
MCHC RBC-ENTMCNC: 33.7 GM/DL — SIGNIFICANT CHANGE UP (ref 32–36)
MCV RBC AUTO: 90.4 FL — SIGNIFICANT CHANGE UP (ref 80–100)
PLATELET # BLD AUTO: 179 K/UL — SIGNIFICANT CHANGE UP (ref 150–400)
POTASSIUM SERPL-MCNC: 3.8 MMOL/L — SIGNIFICANT CHANGE UP (ref 3.5–5.3)
POTASSIUM SERPL-SCNC: 3.8 MMOL/L — SIGNIFICANT CHANGE UP (ref 3.5–5.3)
PROT SERPL-MCNC: 5.8 GM/DL — LOW (ref 6–8.3)
RBC # BLD: 3.02 M/UL — LOW (ref 4.2–5.8)
RBC # FLD: 14.8 % — HIGH (ref 10.3–14.5)
SODIUM SERPL-SCNC: 144 MMOL/L — SIGNIFICANT CHANGE UP (ref 135–145)
WBC # BLD: 10.57 K/UL — HIGH (ref 3.8–10.5)
WBC # FLD AUTO: 10.57 K/UL — HIGH (ref 3.8–10.5)

## 2020-12-19 PROCEDURE — 99232 SBSQ HOSP IP/OBS MODERATE 35: CPT | Mod: CS

## 2020-12-19 RX ADMIN — ATORVASTATIN CALCIUM 40 MILLIGRAM(S): 80 TABLET, FILM COATED ORAL at 21:02

## 2020-12-19 RX ADMIN — PANTOPRAZOLE SODIUM 10 MG/HR: 20 TABLET, DELAYED RELEASE ORAL at 09:54

## 2020-12-19 RX ADMIN — PANTOPRAZOLE SODIUM 10 MG/HR: 20 TABLET, DELAYED RELEASE ORAL at 01:10

## 2020-12-19 RX ADMIN — PANTOPRAZOLE SODIUM 10 MG/HR: 20 TABLET, DELAYED RELEASE ORAL at 20:19

## 2020-12-19 RX ADMIN — SODIUM CHLORIDE 83 MILLILITER(S): 9 INJECTION INTRAMUSCULAR; INTRAVENOUS; SUBCUTANEOUS at 00:58

## 2020-12-19 RX ADMIN — Medication 2: at 07:59

## 2020-12-19 RX ADMIN — AMLODIPINE BESYLATE 5 MILLIGRAM(S): 2.5 TABLET ORAL at 21:02

## 2020-12-19 RX ADMIN — Medication 6 MILLIGRAM(S): at 09:20

## 2020-12-19 RX ADMIN — PREGABALIN 1000 MICROGRAM(S): 225 CAPSULE ORAL at 09:20

## 2020-12-19 RX ADMIN — Medication 6: at 11:44

## 2020-12-19 RX ADMIN — Medication 1 TABLET(S): at 09:20

## 2020-12-19 RX ADMIN — REMDESIVIR 500 MILLIGRAM(S): 5 INJECTION INTRAVENOUS at 09:53

## 2020-12-19 RX ADMIN — Medication 100 MILLIGRAM(S): at 09:20

## 2020-12-19 NOTE — PROGRESS NOTE ADULT - ASSESSMENT
Gi bleeding resolved    hgb stable at 9    protonix drip  adv diet    will try to plan at minimum an egd/colonoscopy subject to OR availability in the coming week    do not discharge pt until gi work up completed.

## 2020-12-19 NOTE — PROGRESS NOTE ADULT - ASSESSMENT
79M.  admitted 12/27/20.  presented to ED c/o maroon stools.  was also recently diagnosed w/ COVID-19.    GIB.  - HH stable.  continue to monitor.  - AB CT negative for diverticular bleed.  - hold ASA.  - Protonix gtt.  - GI.    moderate-to-severe COVID-19.  - room air SpO2 91%.  - monitor inflammatory markers.  - CXR:  infiltrates c/w COVID.  - supplemental O2 via NC.  - 12/17 started:  remdesivir (ALT wnl;  GFR 37).  - 12/17 started:  dexamethasone.  - DVT prophylaxis:  chemoprophylaxis deferred due to GIB.    - ID.  - Pulmonology.    renal insufficiency.  - Cr improving.  - monitor BMP.  - hold Lasix.  - ubaldo IVFs.    hyperglycemia.  - r/o new onset type 2 DM.  - exacerbated by dexamethasone.  - no prior documentation of hx DM.  - A1C.  - correction insulin coverage.    DVT prophylaxis.  - SCDs.    disposition.  - general medical [facility]quez.  - COVID isolation protocol.    communication.  - RN.

## 2020-12-20 LAB
ALBUMIN SERPL ELPH-MCNC: 2.5 G/DL — LOW (ref 3.3–5)
ALP SERPL-CCNC: 79 U/L — SIGNIFICANT CHANGE UP (ref 40–120)
ALT FLD-CCNC: 58 U/L — SIGNIFICANT CHANGE UP (ref 12–78)
AST SERPL-CCNC: 59 U/L — HIGH (ref 15–37)
BILIRUB DIRECT SERPL-MCNC: 0.2 MG/DL — SIGNIFICANT CHANGE UP (ref 0–0.2)
BILIRUB INDIRECT FLD-MCNC: 0.2 MG/DL — SIGNIFICANT CHANGE UP (ref 0.2–1)
BILIRUB SERPL-MCNC: 0.4 MG/DL — SIGNIFICANT CHANGE UP (ref 0.2–1.2)
CREAT SERPL-MCNC: 1.18 MG/DL — SIGNIFICANT CHANGE UP (ref 0.5–1.3)
PROT SERPL-MCNC: 5.7 GM/DL — LOW (ref 6–8.3)

## 2020-12-20 PROCEDURE — 99232 SBSQ HOSP IP/OBS MODERATE 35: CPT | Mod: CS

## 2020-12-20 RX ORDER — ALBUTEROL 90 UG/1
2 AEROSOL, METERED ORAL ONCE
Refills: 0 | Status: COMPLETED | OUTPATIENT
Start: 2020-12-20 | End: 2020-12-20

## 2020-12-20 RX ADMIN — AMLODIPINE BESYLATE 5 MILLIGRAM(S): 2.5 TABLET ORAL at 21:26

## 2020-12-20 RX ADMIN — PANTOPRAZOLE SODIUM 10 MG/HR: 20 TABLET, DELAYED RELEASE ORAL at 10:34

## 2020-12-20 RX ADMIN — Medication 2: at 16:57

## 2020-12-20 RX ADMIN — ALBUTEROL 2 PUFF(S): 90 AEROSOL, METERED ORAL at 23:15

## 2020-12-20 RX ADMIN — Medication 100 MILLIGRAM(S): at 10:10

## 2020-12-20 RX ADMIN — ATORVASTATIN CALCIUM 40 MILLIGRAM(S): 80 TABLET, FILM COATED ORAL at 21:26

## 2020-12-20 RX ADMIN — SODIUM CHLORIDE 83 MILLILITER(S): 9 INJECTION INTRAMUSCULAR; INTRAVENOUS; SUBCUTANEOUS at 10:10

## 2020-12-20 RX ADMIN — REMDESIVIR 500 MILLIGRAM(S): 5 INJECTION INTRAVENOUS at 10:10

## 2020-12-20 RX ADMIN — Medication 2: at 12:00

## 2020-12-20 RX ADMIN — PREGABALIN 1000 MICROGRAM(S): 225 CAPSULE ORAL at 10:10

## 2020-12-20 RX ADMIN — PANTOPRAZOLE SODIUM 10 MG/HR: 20 TABLET, DELAYED RELEASE ORAL at 21:27

## 2020-12-20 RX ADMIN — Medication 1 TABLET(S): at 10:10

## 2020-12-20 RX ADMIN — Medication 6 MILLIGRAM(S): at 10:10

## 2020-12-20 NOTE — PROGRESS NOTE ADULT - ASSESSMENT
79M.  admitted 12/27/20.  presented to ED c/o maroon stools.  was also recently diagnosed w/ COVID-19.    GIB.  - HH 9.2.  continue to monitor.  - AB CT negative for diverticular bleed.  - hold ASA.  - Protonix gtt.  - EGD/colonoscopy.  - GI.    moderate-to-severe COVID-19.  - room air SpO2 91%.  - monitor inflammatory markers.  - CXR:  infiltrates c/w COVID.  - supplemental O2 via NC.  - 12/17 started:  remdesivir (ALT wnl;  GFR 37).  - 12/17 started:  dexamethasone.  - DVT prophylaxis:  chemoprophylaxis deferred due to GIB.    - ID.  - Pulmonology.    renal insufficiency.  - Cr improving.  - monitor BMP.  - hold Lasix.  - ubaldo IVFs.    hyperglycemia.  IGT.  - exacerbated by dexamethasone.  - no prior documentation of hx DM.  - A1C 6.3%.  - correction insulin coverage.    DVT prophylaxis.  - SCDs.    disposition.  - general medical vasquez.  - COVID isolation protocol.    communication.  - RN.

## 2020-12-21 ENCOUNTER — TRANSCRIPTION ENCOUNTER (OUTPATIENT)
Age: 80
End: 2020-12-21

## 2020-12-21 VITALS
TEMPERATURE: 97 F | RESPIRATION RATE: 18 BRPM | HEART RATE: 67 BPM | DIASTOLIC BLOOD PRESSURE: 70 MMHG | SYSTOLIC BLOOD PRESSURE: 138 MMHG | OXYGEN SATURATION: 96 %

## 2020-12-21 LAB
ANION GAP SERPL CALC-SCNC: 7 MMOL/L — SIGNIFICANT CHANGE UP (ref 5–17)
BUN SERPL-MCNC: 27 MG/DL — HIGH (ref 7–23)
CALCIUM SERPL-MCNC: 8.3 MG/DL — LOW (ref 8.5–10.1)
CHLORIDE SERPL-SCNC: 112 MMOL/L — HIGH (ref 96–108)
CO2 SERPL-SCNC: 23 MMOL/L — SIGNIFICANT CHANGE UP (ref 22–31)
CREAT SERPL-MCNC: 1.08 MG/DL — SIGNIFICANT CHANGE UP (ref 0.5–1.3)
CRP SERPL-MCNC: 0.88 MG/DL — HIGH (ref 0–0.4)
D DIMER BLD IA.RAPID-MCNC: 405 NG/ML DDU — HIGH
FERRITIN SERPL-MCNC: 814 NG/ML — HIGH (ref 30–400)
GLUCOSE SERPL-MCNC: 151 MG/DL — HIGH (ref 70–99)
HCT VFR BLD CALC: 26.5 % — LOW (ref 39–50)
HGB BLD-MCNC: 9.2 G/DL — LOW (ref 13–17)
MCHC RBC-ENTMCNC: 31.1 PG — SIGNIFICANT CHANGE UP (ref 27–34)
MCHC RBC-ENTMCNC: 34.7 GM/DL — SIGNIFICANT CHANGE UP (ref 32–36)
MCV RBC AUTO: 89.5 FL — SIGNIFICANT CHANGE UP (ref 80–100)
PLATELET # BLD AUTO: 214 K/UL — SIGNIFICANT CHANGE UP (ref 150–400)
POTASSIUM SERPL-MCNC: 3.4 MMOL/L — LOW (ref 3.5–5.3)
POTASSIUM SERPL-SCNC: 3.4 MMOL/L — LOW (ref 3.5–5.3)
RBC # BLD: 2.96 M/UL — LOW (ref 4.2–5.8)
RBC # FLD: 14.7 % — HIGH (ref 10.3–14.5)
SODIUM SERPL-SCNC: 142 MMOL/L — SIGNIFICANT CHANGE UP (ref 135–145)
WBC # BLD: 9.71 K/UL — SIGNIFICANT CHANGE UP (ref 3.8–10.5)
WBC # FLD AUTO: 9.71 K/UL — SIGNIFICANT CHANGE UP (ref 3.8–10.5)

## 2020-12-21 PROCEDURE — 99232 SBSQ HOSP IP/OBS MODERATE 35: CPT | Mod: CS

## 2020-12-21 RX ORDER — ATORVASTATIN CALCIUM 80 MG/1
1 TABLET, FILM COATED ORAL
Qty: 0 | Refills: 0 | DISCHARGE
Start: 2020-12-21

## 2020-12-21 RX ORDER — POTASSIUM CHLORIDE 20 MEQ
40 PACKET (EA) ORAL ONCE
Refills: 0 | Status: DISCONTINUED | OUTPATIENT
Start: 2020-12-21 | End: 2020-12-21

## 2020-12-21 RX ORDER — SIMVASTATIN 20 MG/1
1 TABLET, FILM COATED ORAL
Qty: 0 | Refills: 0 | DISCHARGE

## 2020-12-21 RX ORDER — PANTOPRAZOLE SODIUM 20 MG/1
1 TABLET, DELAYED RELEASE ORAL
Qty: 14 | Refills: 0
Start: 2020-12-21 | End: 2021-01-03

## 2020-12-21 RX ORDER — PANTOPRAZOLE SODIUM 20 MG/1
40 TABLET, DELAYED RELEASE ORAL DAILY
Refills: 0 | Status: DISCONTINUED | OUTPATIENT
Start: 2020-12-21 | End: 2020-12-21

## 2020-12-21 RX ADMIN — Medication 0: at 11:44

## 2020-12-21 RX ADMIN — Medication 2: at 07:57

## 2020-12-21 RX ADMIN — Medication 1 TABLET(S): at 09:03

## 2020-12-21 RX ADMIN — Medication 6 MILLIGRAM(S): at 09:03

## 2020-12-21 RX ADMIN — Medication 100 MILLIGRAM(S): at 09:03

## 2020-12-21 RX ADMIN — PANTOPRAZOLE SODIUM 40 MILLIGRAM(S): 20 TABLET, DELAYED RELEASE ORAL at 15:54

## 2020-12-21 RX ADMIN — PANTOPRAZOLE SODIUM 10 MG/HR: 20 TABLET, DELAYED RELEASE ORAL at 04:53

## 2020-12-21 RX ADMIN — REMDESIVIR 500 MILLIGRAM(S): 5 INJECTION INTRAVENOUS at 09:32

## 2020-12-21 RX ADMIN — PREGABALIN 1000 MICROGRAM(S): 225 CAPSULE ORAL at 09:02

## 2020-12-21 NOTE — DISCHARGE NOTE PROVIDER - NSDCFUSCHEDAPPT_GEN_ALL_CORE_FT
DOROTHEA VASQUEZ ; 12/28/2020 ; [facility]-Pre-Surgical Testing  DOROTHEA VASQUEZ ; 01/04/2021 ; [facility].  DOROTHEA VASQUEZ ; 01/04/2021 ; [facility] [address]

## 2020-12-21 NOTE — PROGRESS NOTE ADULT - SUBJECTIVE AND OBJECTIVE BOX
Date of service: 12-21-20 @ 15:39      Patient lying in bed; anxious to go home, no complaints      ROS: no fever or chills; denies dizziness, no HA, no SOB or cough, no abdominal pain, no diarrhea or constipation; no dysuria, no urinary frequency, no legs pain, no rashes    MEDICATIONS  (STANDING):  amLODIPine   Tablet 5 milliGRAM(s) Oral at bedtime  atorvastatin 40 milliGRAM(s) Oral at bedtime  cyanocobalamin 1000 MICROGram(s) Oral daily  dexAMETHasone  Injectable 6 milliGRAM(s) IV Push daily  dextrose 40% Gel 15 Gram(s) Oral once  dextrose 5%. 1000 milliLiter(s) (50 mL/Hr) IV Continuous <Continuous>  dextrose 5%. 1000 milliLiter(s) (100 mL/Hr) IV Continuous <Continuous>  dextrose 50% Injectable 25 Gram(s) IV Push once  dextrose 50% Injectable 12.5 Gram(s) IV Push once  dextrose 50% Injectable 25 Gram(s) IV Push once  glucagon  Injectable 1 milliGRAM(s) IntraMuscular once  insulin lispro (ADMELOG) corrective regimen sliding scale   SubCutaneous three times a day before meals  metoprolol tartrate 100 milliGRAM(s) Oral daily  multivitamin 1 Tablet(s) Oral daily  pantoprazole    Tablet 40 milliGRAM(s) Oral daily  potassium chloride    Tablet ER 40 milliEquivalent(s) Oral once    MEDICATIONS  (PRN):      Vital Signs Last 24 Hrs  T(C): 36.3 (21 Dec 2020 09:02), Max: 36.3 (20 Dec 2020 20:53)  T(F): 97.4 (21 Dec 2020 09:02), Max: 97.4 (21 Dec 2020 09:02)  HR: 67 (21 Dec 2020 09:02) (67 - 82)  BP: 138/70 (21 Dec 2020 09:02) (116/57 - 138/70)  BP(mean): --  RR: 18 (21 Dec 2020 09:02) (18 - 18)  SpO2: 96% (21 Dec 2020 09:02) (96% - 98%)        Physical Exam:    Constitutional: frail looking  HEENT: NC/AT, EOMI, PERRLA, conjunctivae clear; ears and nose atraumatic; pharynx clear  Neck: supple; thyroid not palpable  Back: no tenderness  Respiratory: respiratory effort normal; clear to auscultation  Cardiovascular: S1S2 regular, no murmurs  Abdomen: soft, not tender, not distended, positive BS; no liver or spleen organomegaly  Genitourinary: no suprapubic tenderness  Musculoskeletal: no muscle tenderness, no joint swelling or tenderness  Neurological/ Psychiatric: AxOx3, judgement and insight normal;  moving all extremities  Skin: no rashes; no palpable lesions    Labs: all available labs reviewed                         Labs:                        9.2    9.71  )-----------( 214      ( 21 Dec 2020 07:00 )             26.5     12-21    142  |  112<H>  |  27<H>  ----------------------------<  151<H>  3.4<L>   |  23  |  1.08    Ca    8.3<L>      21 Dec 2020 07:00    TPro  5.3<L>  /  Alb  2.3<L>  /  TBili  0.4  /  DBili  0.2  /  AST  47<H>  /  ALT  65  /  AlkPhos  74  12-21           Cultures:       C-Reactive Protein, Serum: 0.88 mg/dL (12-21-20 @ 07:00)  Ferritin, Serum: 814 ng/mL (12-21-20 @ 07:00)  D-Dimer Assay, Quantitative: 405 ng/mL DDU (12-21-20 @ 07:00)  C-Reactive Protein, Serum: 2.51 mg/dL (12-19-20 @ 08:04)  Ferritin, Serum: 986 ng/mL (12-19-20 @ 08:04)  D-Dimer Assay, Quantitative: 425 ng/mL DDU (12-19-20 @ 08:04)          COVID-19 PCR . (12.17.20 @ 12:12)    COVID-19 PCR: Detected: You can help in the fight against COVID-19. [facility] may contact  you to see if you are interested in voluntarily participating in one of  our clinical trials.  Testing is performed using polymerase chain reaction (PCR) or  transcription mediated amplification (TMA). This COVID-19 (SARS-CoV-2)  nucleic acid amplification test was validated by [facility] and is  in use under the FDA Emergency Use Authorization (EUA) for clinical labs  CLIA-certified to perform high complexity testing. Test results should be  correlated with clinical presentation, patient history, and epidemiology.          Radiology: all available radiological tests reviewed  < from: Xray Chest 1 View- PORTABLE-Routine (Xray Chest 1 View- PORTABLE-Routine .) (12.17.20 @ 13:12) >    EXAM:  XR CHEST PORTABLE ROUTINE 1V                            PROCEDURE DATE:  12/17/2020          INTERPRETATION:  AP erect chest on December 17, 2020 at 12:45 PM. Patient has a GI bleed.    Heart magnified by technique. Horizontal plate and screw on the right similar to CAT scan of August 9, 2016.    There are some scattered midlung field infiltrates laterally that suggest Covid infection.    IMPRESSION: Mid lateral infiltrates suggest Covid infection.      < end of copied text >    Advanced directives addressed: full resuscitation
CC:  Patient is a 79y old  Male who presents with a chief complaint of GIB (18 Dec 2020 09:11)    SUBJECTIVE:     -no new complaints or issues at current time.    ROS:  all other review of systems are negative unless indicated above.    amLODIPine   Tablet 5 milliGRAM(s) Oral at bedtime  atorvastatin 40 milliGRAM(s) Oral at bedtime  cyanocobalamin 1000 MICROGram(s) Oral daily  dexAMETHasone  Injectable 6 milliGRAM(s) IV Push daily  metoprolol tartrate 100 milliGRAM(s) Oral daily  multivitamin 1 Tablet(s) Oral daily  pantoprazole Infusion 8 mG/Hr IV Continuous <Continuous>  remdesivir  IVPB   IV Intermittent   remdesivir  IVPB 100 milliGRAM(s) IV Intermittent every 24 hours  sodium chloride 0.9%. 1000 milliLiter(s) IV Continuous <Continuous>    T(C): 36.7 (12-18-20 @ 08:37), Max: 37.3 (12-17-20 @ 18:19)  HR: 92 (12-18-20 @ 08:37) (92 - 112)  BP: 136/68 (12-18-20 @ 08:37) (125/72 - 140/61)  RR: 20 (12-18-20 @ 08:37) (18 - 20)  SpO2: 96% (12-18-20 @ 08:37) (91% - 96%)    Constitutional: NAD.   HEENT: PERRL, EOMI, MMM.  Neck: Soft and supple, No carotid bruit, No JVD  Respiratory: Breath sounds are clear bilaterally, No wheezing, rales or rhonchi  Cardiovascular: S1 and S2, regular rate and rhythm, no murmur, rub or gallop.  Gastrointestinal: Bowel Sounds present, soft, nontender, nondistended, no guarding, no rebound, no mass.  Extremities: No peripheral edema  Vascular: 2+ peripheral pulses  Neurological: A/O x , no focal deficits  Musculoskeletal: 5/5 strength b/l upper and lower extremities  Skin:  no visible rashes.                         10.0   4.70  )-----------( 141      ( 18 Dec 2020 10:12 )             29.0     PT/INR - ( 17 Dec 2020 12:12 )   PT: 14.2 sec;   INR: 1.24 ratio         PTT - ( 17 Dec 2020 12:12 )  PTT:30.7 sec  12-18    145  |  115<H>  |  45<H>  ----------------------------<  286<H>  3.5   |  22  |  1.35<H>    Ca    9.0      18 Dec 2020 10:12    TPro  5.6<L>  /  Alb  2.3<L>  /  TBili  0.4  /  DBili  0.1  /  AST  40<H>  /  ALT  33  /  AlkPhos  74  12-18  
CC:  Patient is a 79y old  Male who presents with a chief complaint of GIB (18 Dec 2020 09:11)    SUBJECTIVE:     -no new complaints or issues at current time.    ROS:  all other review of systems are negative unless indicated above.    amLODIPine   Tablet 5 milliGRAM(s) Oral at bedtime  atorvastatin 40 milliGRAM(s) Oral at bedtime  cyanocobalamin 1000 MICROGram(s) Oral daily  dexAMETHasone  Injectable 6 milliGRAM(s) IV Push daily  metoprolol tartrate 100 milliGRAM(s) Oral daily  multivitamin 1 Tablet(s) Oral daily  pantoprazole Infusion 8 mG/Hr IV Continuous <Continuous>  remdesivir  IVPB   IV Intermittent   remdesivir  IVPB 100 milliGRAM(s) IV Intermittent every 24 hours  sodium chloride 0.9%. 1000 milliLiter(s) IV Continuous <Continuous>    T(C): 36.7 (12-18-20 @ 08:37), Max: 37.3 (12-17-20 @ 18:19)  HR: 92 (12-18-20 @ 08:37) (92 - 112)  BP: 136/68 (12-18-20 @ 08:37) (125/72 - 140/61)  RR: 20 (12-18-20 @ 08:37) (18 - 20)  SpO2: 96% (12-18-20 @ 08:37) (91% - 96%)    Constitutional: NAD.   HEENT: PERRL, EOMI, MMM.  Neck: Soft and supple, No carotid bruit, No JVD  Respiratory: Breath sounds are clear bilaterally, No wheezing, rales or rhonchi  Cardiovascular: S1 and S2, regular rate and rhythm, no murmur, rub or gallop.  Gastrointestinal: Bowel Sounds present, soft, nontender, nondistended, no guarding, no rebound, no mass.  Extremities: No peripheral edema  Vascular: 2+ peripheral pulses  Neurological: A/O x , no focal deficits  Musculoskeletal: 5/5 strength b/l upper and lower extremities  Skin:  no visible rashes.                         10.0   4.70  )-----------( 141      ( 18 Dec 2020 10:12 )             29.0     PT/INR - ( 17 Dec 2020 12:12 )   PT: 14.2 sec;   INR: 1.24 ratio         PTT - ( 17 Dec 2020 12:12 )  PTT:30.7 sec  12-18    145  |  115<H>  |  45<H>  ----------------------------<  286<H>  3.5   |  22  |  1.35<H>    Ca    9.0      18 Dec 2020 10:12    TPro  5.6<L>  /  Alb  2.3<L>  /  TBili  0.4  /  DBili  0.1  /  AST  40<H>  /  ALT  33  /  AlkPhos  74  12-18  
CC:  Patient is a 79y old  Male who presents with a chief complaint of gi bleed (19 Dec 2020 16:32)    SUBJECTIVE:     -no new complaints or issues at current time.    ROS:  all other review of systems are negative unless indicated above.    amLODIPine   Tablet 5 milliGRAM(s) Oral at bedtime  atorvastatin 40 milliGRAM(s) Oral at bedtime  cyanocobalamin 1000 MICROGram(s) Oral daily  dexAMETHasone  Injectable 6 milliGRAM(s) IV Push daily  insulin lispro (ADMELOG) corrective regimen sliding scale   SubCutaneous three times a day before meals  metoprolol tartrate 100 milliGRAM(s) Oral daily  multivitamin 1 Tablet(s) Oral daily  pantoprazole Infusion 8 mG/Hr IV Continuous <Continuous>  remdesivir  IVPB   IV Intermittent   remdesivir  IVPB 100 milliGRAM(s) IV Intermittent every 24 hours  sodium chloride 0.9%. 1000 milliLiter(s) IV Continuous <Continuous>    T(C): 35.9 (12-20-20 @ 09:08), Max: 36.4 (12-20-20 @ 00:25)  HR: 74 (12-20-20 @ 09:08) (68 - 74)  BP: 121/57 (12-20-20 @ 09:08) (113/61 - 132/76)  RR: 16 (12-20-20 @ 09:08) (16 - 18)  SpO2: 97% (12-20-20 @ 09:08) (97% - 98%)    Constitutional: NAD.   HEENT: PERRL, EOMI, MMM.  Neck: Soft and supple, No carotid bruit, No JVD  Respiratory: Breath sounds are clear bilaterally, No wheezing, rales or rhonchi  Cardiovascular: S1 and S2, regular rate and rhythm, no murmur, rub or gallop.  Gastrointestinal: Bowel Sounds present, soft, nontender, nondistended, no guarding, no rebound, no mass.  Extremities: No peripheral edema  Vascular: 2+ peripheral pulses  Neurological: A/O x , no focal deficits  Musculoskeletal: 5/5 strength b/l upper and lower extremities  Skin:  no visible rashes.                         9.2    10.57 )-----------( 179      ( 19 Dec 2020 08:04 )             27.3       12-20    x   |  x   |  x   ----------------------------<  x   x    |  x   |  1.18    Ca    8.9      19 Dec 2020 08:04    TPro  5.7<L>  /  Alb  2.5<L>  /  TBili  0.4  /  DBili  0.2  /  AST  59<H>  /  ALT  58  /  AlkPhos  79  12-20  
CC:  Patient is a 79y old  Male who presents with a chief complaint of gi bleed/covid (21 Dec 2020 11:07)    SUBJECTIVE:     - patient breathing comfortably on RA.  - no further bleeding.    ROS:  all other review of systems are negative unless indicated above.    amLODIPine   Tablet 5 milliGRAM(s) Oral at bedtime  atorvastatin 40 milliGRAM(s) Oral at bedtime  cyanocobalamin 1000 MICROGram(s) Oral daily  dexAMETHasone  Injectable 6 milliGRAM(s) IV Push daily  dextrose 40% Gel 15 Gram(s) Oral once  dextrose 5%. 1000 milliLiter(s) IV Continuous <Continuous>  dextrose 5%. 1000 milliLiter(s) IV Continuous <Continuous>  dextrose 50% Injectable 25 Gram(s) IV Push once  dextrose 50% Injectable 12.5 Gram(s) IV Push once  dextrose 50% Injectable 25 Gram(s) IV Push once  glucagon  Injectable 1 milliGRAM(s) IntraMuscular once  insulin lispro (ADMELOG) corrective regimen sliding scale   SubCutaneous three times a day before meals  metoprolol tartrate 100 milliGRAM(s) Oral daily  multivitamin 1 Tablet(s) Oral daily  pantoprazole    Tablet 40 milliGRAM(s) Oral daily  potassium chloride    Tablet ER 40 milliEquivalent(s) Oral once    T(C): 36.3 (12-21-20 @ 09:02), Max: 36.3 (12-20-20 @ 20:53)  HR: 67 (12-21-20 @ 09:02) (67 - 82)  BP: 138/70 (12-21-20 @ 09:02) (116/57 - 138/70)  RR: 18 (12-21-20 @ 09:02) (18 - 18)  SpO2: 96% (12-21-20 @ 09:02) (96% - 98%)    Constitutional: NAD.   HEENT: PERRL, EOMI, MMM.  Neck: Soft and supple, No carotid bruit, No JVD  Respiratory: Breath sounds are clear bilaterally, No wheezing, rales or rhonchi  Cardiovascular: S1 and S2, regular rate and rhythm, no murmur, rub or gallop.  Gastrointestinal: Bowel Sounds present, soft, nontender, nondistended, no guarding, no rebound, no mass.  Extremities: No peripheral edema  Vascular: 2+ peripheral pulses  Neurological: A/O x , no focal deficits  Musculoskeletal: 5/5 strength b/l upper and lower extremities  Skin:  no visible rashes.                         9.2    9.71  )-----------( 214      ( 21 Dec 2020 07:00 )             26.5       12-21    142  |  112<H>  |  27<H>  ----------------------------<  151<H>  3.4<L>   |  23  |  1.08    Ca    8.3<L>      21 Dec 2020 07:00    TPro  5.3<L>  /  Alb  2.3<L>  /  TBili  0.4  /  DBili  0.2  /  AST  47<H>  /  ALT  65  /  AlkPhos  74  12-21    
no further dark maroon colored bm's    Allergies    No Known Allergies    Intolerances        MEDICATIONS  (STANDING):  amLODIPine   Tablet 5 milliGRAM(s) Oral at bedtime  atorvastatin 40 milliGRAM(s) Oral at bedtime  cyanocobalamin 1000 MICROGram(s) Oral daily  dexAMETHasone  Injectable 6 milliGRAM(s) IV Push daily  dextrose 40% Gel 15 Gram(s) Oral once  dextrose 5%. 1000 milliLiter(s) (50 mL/Hr) IV Continuous <Continuous>  dextrose 5%. 1000 milliLiter(s) (100 mL/Hr) IV Continuous <Continuous>  dextrose 50% Injectable 25 Gram(s) IV Push once  dextrose 50% Injectable 12.5 Gram(s) IV Push once  dextrose 50% Injectable 25 Gram(s) IV Push once  glucagon  Injectable 1 milliGRAM(s) IntraMuscular once  insulin lispro (ADMELOG) corrective regimen sliding scale   SubCutaneous three times a day before meals  metoprolol tartrate 100 milliGRAM(s) Oral daily  multivitamin 1 Tablet(s) Oral daily    MEDICATIONS  (PRN):      REVIEW OF SYSTEMS      General:	No fever or chills    Skin/Breast: No jaundice or rash   		  ENMT: denies sore throat or thrush    Respiratory and Thorax: Denies dyspnea or cough or shortness of breath  	  Cardiovascular: Denies chest pain or palpitations 	    Gastrointestinal: Denies jaundice or pruritis    Genitourinary: Denies dysuria or hematuria	    Musculoskeletal: Denies muscular pain or swelling	    Neurological: Denies confusion or tremor	    Hematology/Lymphatics: Denies easy bruising or bleeding 	    Endocrine:	Denies polyphagia or polyuria    See above hx otherwise neg for any major organ systems    PHYSICAL EXAM:    Vital Signs Last 24 Hrs  T(C): 36.3 (21 Dec 2020 09:02), Max: 36.3 (20 Dec 2020 20:53)  T(F): 97.4 (21 Dec 2020 09:02), Max: 97.4 (21 Dec 2020 09:02)  HR: 67 (21 Dec 2020 09:02) (67 - 82)  BP: 138/70 (21 Dec 2020 09:02) (116/57 - 138/70)  BP(mean): --  RR: 18 (21 Dec 2020 09:02) (18 - 18)  SpO2: 96% (21 Dec 2020 09:02) (96% - 98%)    Constitutional: no acute distress    ENMT: NC/AT scl anicteric opm pink no lesions     Neck: supple. No jvd or LN    Respiratory: Clear     Cardiovascular: RRR s1s2     Gastrointestinal: Pos bs , soft , not tender, no hepatosplenomegaly,  no mass      Back: No CVA tenderness    Extremities: NO cce     Neurological: Alert and oriented x 3     Skin: No rash or jaundice    Date/Time:12-21 @ 07:00    ALT/SGPT: 65  Albumin: 2.3  AST/SGOT: 47  Bilirubin Direct: 0.2  Bilirubin Total: 0.4  Ca: 8.3  eGFR : 75  eGFR Non-: 64  Lipase: --  Amylase: --  INR: --  PTT: --        Date/Time:12-20 @ 07:10    ALT/SGPT: 58  Albumin: 2.5  AST/SGOT: 59  Bilirubin Direct: 0.2  Bilirubin Total: 0.4  Ca: --  eGFR : 67  eGFR Non-: 58  Lipase: --  Amylase: --  INR: --  PTT: --        Date/Time:12-19 @ 08:04    ALT/SGPT: 42  Albumin: 2.2  AST/SGOT: 52  Bilirubin Direct: 0.1  Bilirubin Total: 0.3  Ca: 8.9  eGFR : 73  eGFR Non-: 63  Lipase: --  Amylase: --  INR: --  PTT: --            12-21    142  |  112<H>  |  27<H>  ----------------------------<  151<H>  3.4<L>   |  23  |  1.08    Ca    8.3<L>      21 Dec 2020 07:00    TPro  5.3<L>  /  Alb  2.3<L>  /  TBili  0.4  /  DBili  0.2  /  AST  47<H>  /  ALT  65  /  AlkPhos  74  12-21                            9.2    9.71  )-----------( 214      ( 21 Dec 2020 07:00 )             26.5       
no further bloody stools reported.   no n/v or abd pain        Allergies    No Known Allergies    Intolerances        MEDICATIONS  (STANDING):  amLODIPine   Tablet 5 milliGRAM(s) Oral at bedtime  atorvastatin 40 milliGRAM(s) Oral at bedtime  cyanocobalamin 1000 MICROGram(s) Oral daily  dexAMETHasone  Injectable 6 milliGRAM(s) IV Push daily  dextrose 40% Gel 15 Gram(s) Oral once  dextrose 5%. 1000 milliLiter(s) (50 mL/Hr) IV Continuous <Continuous>  dextrose 5%. 1000 milliLiter(s) (100 mL/Hr) IV Continuous <Continuous>  dextrose 50% Injectable 25 Gram(s) IV Push once  dextrose 50% Injectable 12.5 Gram(s) IV Push once  dextrose 50% Injectable 25 Gram(s) IV Push once  glucagon  Injectable 1 milliGRAM(s) IntraMuscular once  insulin lispro (ADMELOG) corrective regimen sliding scale   SubCutaneous three times a day before meals  metoprolol tartrate 100 milliGRAM(s) Oral daily  multivitamin 1 Tablet(s) Oral daily  pantoprazole Infusion 8 mG/Hr (10 mL/Hr) IV Continuous <Continuous>  remdesivir  IVPB   IV Intermittent   remdesivir  IVPB 100 milliGRAM(s) IV Intermittent every 24 hours  sodium chloride 0.9%. 1000 milliLiter(s) (83 mL/Hr) IV Continuous <Continuous>    MEDICATIONS  (PRN):      REVIEW OF SYSTEMS      General:	No fever or chills    Skin/Breast: No jaundice or rash   		  ENMT: denies sore throat or thrush    Respiratory and Thorax: Denies dyspnea or cough or shortness of breath  	  Cardiovascular: Denies chest pain or palpitations 	    Gastrointestinal: Denies jaundice or pruritis    Genitourinary: Denies dysuria or hematuria	    Musculoskeletal: Denies muscular pain or swelling	    Neurological: Denies confusion or tremor	    Hematology/Lymphatics: Denies easy bruising     Endocrine:	Denies polyphagia or polyuria    See above hx otherwise neg for any major organ systems    PHYSICAL EXAM:    Vital Signs Last 24 Hrs  T(C): 36 (19 Dec 2020 15:23), Max: 36.8 (18 Dec 2020 21:01)  T(F): 96.8 (19 Dec 2020 15:23), Max: 98.3 (18 Dec 2020 21:01)  HR: 68 (19 Dec 2020 15:23) (68 - 82)  BP: 113/61 (19 Dec 2020 15:23) (113/61 - 127/69)  BP(mean): --  RR: 18 (19 Dec 2020 15:23) (18 - 18)  SpO2: 97% (19 Dec 2020 15:23) (96% - 97%)    Constitutional: no acute distress    ENMT: NC/AT scl anicteric opm pink no lesions     Neck: supple. No jvd or LN    Respiratory: Clear     Cardiovascular: RRR s1s2     Gastrointestinal: Pos bs , soft , not tender, no hepatosplenomegaly,  no mass      Back: No CVA tenderness    Extremities: NO cce     Neurological: Alert and oriented x 3     Skin: No rash or jaundice    Date/Time:12-19 @ 08:04    ALT/SGPT: 42  Albumin: 2.2  AST/SGOT: 52  Bilirubin Direct: 0.1  Bilirubin Total: 0.3  Ca: 8.9  eGFR : 73  eGFR Non-: 63  Lipase: --  Amylase: --  INR: --  PTT: --        Date/Time:12-18 @ 10:12    ALT/SGPT: 33  Albumin: 2.3  AST/SGOT: 40  Bilirubin Direct: 0.1  Bilirubin Total: 0.4  Ca: 9.0  eGFR : 57  eGFR Non-: 49  Lipase: --  Amylase: --  INR: --  PTT: --        Date/Time:12-17 @ 19:45    ALT/SGPT: 35  Albumin: 2.4  AST/SGOT: 46  Bilirubin Direct: 0.1  Bilirubin Total: 0.3  Ca: --  eGFR : 47  eGFR Non-: 41  Lipase: --  Amylase: --  INR: --  PTT: --        Date/Time:12-17 @ 12:12    ALT/SGPT: 49  Albumin: 2.4  AST/SGOT: 72  Bilirubin Direct: --  Bilirubin Total: 0.4  Ca: 8.8  eGFR : 43  eGFR Non-: 37  Lipase: --  Amylase: --  INR: 1.24  PTT: --            12-19    144  |  117<H>  |  35<H>  ----------------------------<  163<H>  3.8   |  21<L>  |  1.10    Ca    8.9      19 Dec 2020 08:04    TPro  5.8<L>  /  Alb  2.2<L>  /  TBili  0.3  /  DBili  0.1  /  AST  52<H>  /  ALT  42  /  AlkPhos  82  12-19                            9.2    10.57 )-----------( 179      ( 19 Dec 2020 08:04 )             27.3

## 2020-12-21 NOTE — DISCHARGE NOTE NURSING/CASE MANAGEMENT/SOCIAL WORK - NSDCPEPTCAREGIVEDUMATLIST _GEN_ALL_CORE

## 2020-12-21 NOTE — PROGRESS NOTE ADULT - ASSESSMENT
79Male with past medical history gout, obesity, hyperlipidemia, anxiety, hypertension, nephrolithiasis, aortic stenosis s/p valve replacement, arthritis admitted on 12/17 for evaluation of dark stools, weakness and cough productive of dark sputum; the patient was recently hospitalized for treatment of COVID-19 infection and he was also tested and found to be positive for COVID-19. Upon admission he was hypoxic to 91%. Notes mild intermittent fever. No recent travel.     1. Patient admitted with COVID-19 infection superimposed on viral pneumonia; also noted with GI bleed  - follow up cultures   - completed his rx for COVID-19 infection  - to be discharged home today  2. other issues; per medicine

## 2020-12-21 NOTE — DISCHARGE NOTE PROVIDER - NSDCMRMEDTOKEN_GEN_ALL_CORE_FT
amLODIPine 5 mg oral tablet: 1 tab(s) orally once a day (in the morning)  atorvastatin 40 mg oral tablet: 1 tab(s) orally once a day (at bedtime)  chondroitin 400 mg oral capsule: 2 cap(s) orally once a day  Fish Oil: 1500 milligram(s) orally  furosemide 20 mg oral tablet: 1 tab(s) orally once a day (in the morning)  metoprolol tartrate 100 mg oral tablet: 1 tab(s) orally once a day (in the morning)  multivitamin: 1 tab(s) orally once a day  pantoprazole 40 mg oral delayed release tablet: 1 tab(s) orally once a day  potassium chloride 10 mEq oral tablet, extended release: 1 tab(s) orally once a day (in the morning)  Vitamin B-12: 1 tab(s) orally once a day

## 2020-12-21 NOTE — DISCHARGE NOTE NURSING/CASE MANAGEMENT/SOCIAL WORK - PATIENT PORTAL LINK FT
You can access the FollowMyHealth Patient Portal offered by [facility] by registering at the following website: [email or web address]. By joining [facility]’s FollowMyHealth portal, you will also be able to view your health information using other applications (apps) compatible with our system.

## 2020-12-21 NOTE — DISCHARGE NOTE PROVIDER - NSDCHHHOMEBOUND_GEN_ALL_CORE
Requires supervison due to deteriorating mental status.../Shortness of breath with minimal ambulation/Fall risk

## 2020-12-21 NOTE — PROGRESS NOTE ADULT - PROVIDER SPECIALTY LIST ADULT
Gastroenterology
Hospitalist
Infectious Disease
Gastroenterology

## 2020-12-21 NOTE — PROGRESS NOTE ADULT - ASSESSMENT
79M.  admitted 12/27/20.  presented to ED c/o maroon stools.  was also recently diagnosed w/ COVID-19.    GIB.  - HH 9.2.  continue to monitor.  - AB CT negative for diverticular bleed.  - may resume ASA in 48 hours.  - Protonix 40mg po qd.  - GI f/u outpatient w/ Dr. Rooney in 4 weeks, re:  EGD/colonoscopy.    COVID-19.  - room air SpO2 96-98%.  - inflammatory markers trending down.  - completed remdesivir.  - DC dexamethasone.  - DVT prophylaxis:  chemoprophylaxis deferred due to GIB.      renal insufficiency.  - Cr resolved.    hyperglycemia.  IGT.  - exacerbated by dexamethasone.  - no prior documentation of hx DM.  - DC dexamethasone.  - A1C 6.3%.  - lifestyle modifications.  - PMD f/u outpatient.    disposition.  - DC home.    communication.  - RN.  - CM.

## 2020-12-21 NOTE — PROGRESS NOTE ADULT - ASSESSMENT
Covid pos    Gi bleeding likely diverticular bleeding resolved      may resume aspirin in 48 hrs.     soft mechanical diet     follow up with Dr. Rooney in 4 weeks.     colonoscopy as out pt

## 2020-12-24 DIAGNOSIS — Z95.2 PRESENCE OF PROSTHETIC HEART VALVE: ICD-10-CM

## 2020-12-24 DIAGNOSIS — Z79.82 LONG TERM (CURRENT) USE OF ASPIRIN: ICD-10-CM

## 2020-12-24 DIAGNOSIS — K92.2 GASTROINTESTINAL HEMORRHAGE, UNSPECIFIED: ICD-10-CM

## 2020-12-24 DIAGNOSIS — E66.9 OBESITY, UNSPECIFIED: ICD-10-CM

## 2020-12-24 DIAGNOSIS — R73.9 HYPERGLYCEMIA, UNSPECIFIED: ICD-10-CM

## 2020-12-24 DIAGNOSIS — J12.89 OTHER VIRAL PNEUMONIA: ICD-10-CM

## 2020-12-24 DIAGNOSIS — R09.02 HYPOXEMIA: ICD-10-CM

## 2020-12-24 DIAGNOSIS — U07.1 COVID-19: ICD-10-CM

## 2020-12-24 DIAGNOSIS — Z98.890 OTHER SPECIFIED POSTPROCEDURAL STATES: ICD-10-CM

## 2020-12-24 DIAGNOSIS — N17.9 ACUTE KIDNEY FAILURE, UNSPECIFIED: ICD-10-CM

## 2020-12-24 DIAGNOSIS — E78.5 HYPERLIPIDEMIA, UNSPECIFIED: ICD-10-CM

## 2020-12-24 DIAGNOSIS — I10 ESSENTIAL (PRIMARY) HYPERTENSION: ICD-10-CM

## 2020-12-24 DIAGNOSIS — Z79.1 LONG TERM (CURRENT) USE OF NON-STEROIDAL ANTI-INFLAMMATORIES (NSAID): ICD-10-CM

## 2021-01-04 ENCOUNTER — APPOINTMENT (OUTPATIENT)
Dept: ORTHOPEDIC SURGERY | Facility: HOSPITAL | Age: 81
End: 2021-01-04

## 2021-01-21 ENCOUNTER — APPOINTMENT (OUTPATIENT)
Dept: ORTHOPEDIC SURGERY | Facility: CLINIC | Age: 81
End: 2021-01-21

## 2021-02-12 DIAGNOSIS — Z01.818 ENCOUNTER FOR OTHER PREPROCEDURAL EXAMINATION: ICD-10-CM

## 2021-02-13 ENCOUNTER — APPOINTMENT (OUTPATIENT)
Dept: DISASTER EMERGENCY | Facility: CLINIC | Age: 81
End: 2021-02-13

## 2021-02-14 LAB — SARS-COV-2 N GENE NPH QL NAA+PROBE: NOT DETECTED

## 2021-02-16 ENCOUNTER — RESULT REVIEW (OUTPATIENT)
Age: 81
End: 2021-02-16

## 2021-02-16 ENCOUNTER — OUTPATIENT (OUTPATIENT)
Dept: OUTPATIENT SERVICES | Facility: HOSPITAL | Age: 81
LOS: 1 days | Discharge: HOME | End: 2021-02-16
Payer: MEDICARE

## 2021-02-16 VITALS
OXYGEN SATURATION: 99 % | DIASTOLIC BLOOD PRESSURE: 67 MMHG | RESPIRATION RATE: 16 BRPM | SYSTOLIC BLOOD PRESSURE: 136 MMHG | HEART RATE: 85 BPM | TEMPERATURE: 97 F | HEIGHT: 67 IN | WEIGHT: 207.9 LBS

## 2021-02-16 DIAGNOSIS — Z98.89 OTHER SPECIFIED POSTPROCEDURAL STATES: Chronic | ICD-10-CM

## 2021-02-16 DIAGNOSIS — Z95.2 PRESENCE OF PROSTHETIC HEART VALVE: Chronic | ICD-10-CM

## 2021-02-16 DIAGNOSIS — K92.2 GASTROINTESTINAL HEMORRHAGE, UNSPECIFIED: ICD-10-CM

## 2021-02-16 PROCEDURE — 88312 SPECIAL STAINS GROUP 1: CPT

## 2021-02-16 PROCEDURE — 88305 TISSUE EXAM BY PATHOLOGIST: CPT | Mod: 26

## 2021-02-16 PROCEDURE — 88312 SPECIAL STAINS GROUP 1: CPT | Mod: 26

## 2021-02-16 PROCEDURE — 88305 TISSUE EXAM BY PATHOLOGIST: CPT

## 2021-02-19 DIAGNOSIS — K29.80 DUODENITIS WITHOUT BLEEDING: ICD-10-CM

## 2021-02-19 DIAGNOSIS — K57.30 DIVERTICULOSIS OF LARGE INTESTINE WITHOUT PERFORATION OR ABSCESS WITHOUT BLEEDING: ICD-10-CM

## 2021-02-19 DIAGNOSIS — I34.0 NONRHEUMATIC MITRAL (VALVE) INSUFFICIENCY: ICD-10-CM

## 2021-02-19 DIAGNOSIS — K29.50 UNSPECIFIED CHRONIC GASTRITIS WITHOUT BLEEDING: ICD-10-CM

## 2021-02-19 DIAGNOSIS — Z86.19 PERSONAL HISTORY OF OTHER INFECTIOUS AND PARASITIC DISEASES: ICD-10-CM

## 2021-02-19 DIAGNOSIS — E66.9 OBESITY, UNSPECIFIED: ICD-10-CM

## 2021-02-19 DIAGNOSIS — K44.9 DIAPHRAGMATIC HERNIA WITHOUT OBSTRUCTION OR GANGRENE: ICD-10-CM

## 2021-02-19 DIAGNOSIS — E78.5 HYPERLIPIDEMIA, UNSPECIFIED: ICD-10-CM

## 2021-02-19 DIAGNOSIS — M10.9 GOUT, UNSPECIFIED: ICD-10-CM

## 2021-02-19 DIAGNOSIS — Z95.4 PRESENCE OF OTHER HEART-VALVE REPLACEMENT: ICD-10-CM

## 2021-02-19 DIAGNOSIS — D12.3 BENIGN NEOPLASM OF TRANSVERSE COLON: ICD-10-CM

## 2021-02-19 DIAGNOSIS — I10 ESSENTIAL (PRIMARY) HYPERTENSION: ICD-10-CM

## 2021-02-19 DIAGNOSIS — K92.2 GASTROINTESTINAL HEMORRHAGE, UNSPECIFIED: ICD-10-CM

## 2021-02-19 DIAGNOSIS — F41.9 ANXIETY DISORDER, UNSPECIFIED: ICD-10-CM

## 2021-02-19 DIAGNOSIS — Z79.82 LONG TERM (CURRENT) USE OF ASPIRIN: ICD-10-CM

## 2021-02-19 DIAGNOSIS — K64.8 OTHER HEMORRHOIDS: ICD-10-CM

## 2021-03-02 ENCOUNTER — APPOINTMENT (OUTPATIENT)
Dept: ORTHOPEDIC SURGERY | Facility: CLINIC | Age: 81
End: 2021-03-02

## 2021-04-15 ENCOUNTER — OUTPATIENT (OUTPATIENT)
Dept: OUTPATIENT SERVICES | Facility: HOSPITAL | Age: 81
LOS: 1 days | End: 2021-04-15
Payer: MEDICARE

## 2021-04-15 DIAGNOSIS — Z95.2 PRESENCE OF PROSTHETIC HEART VALVE: Chronic | ICD-10-CM

## 2021-04-15 DIAGNOSIS — Z98.89 OTHER SPECIFIED POSTPROCEDURAL STATES: Chronic | ICD-10-CM

## 2021-04-15 DIAGNOSIS — Z20.828 CONTACT WITH AND (SUSPECTED) EXPOSURE TO OTHER VIRAL COMMUNICABLE DISEASES: ICD-10-CM

## 2021-04-15 LAB — SARS-COV-2 RNA SPEC QL NAA+PROBE: SIGNIFICANT CHANGE UP

## 2021-04-15 PROCEDURE — C9803: CPT

## 2021-04-15 PROCEDURE — U0003: CPT

## 2021-04-15 PROCEDURE — U0005: CPT

## 2021-04-16 DIAGNOSIS — Z20.828 CONTACT WITH AND (SUSPECTED) EXPOSURE TO OTHER VIRAL COMMUNICABLE DISEASES: ICD-10-CM

## 2021-05-11 ENCOUNTER — APPOINTMENT (OUTPATIENT)
Dept: ORTHOPEDIC SURGERY | Facility: CLINIC | Age: 81
End: 2021-05-11
Payer: MEDICARE

## 2021-05-11 VITALS
SYSTOLIC BLOOD PRESSURE: 136 MMHG | WEIGHT: 211 LBS | BODY MASS INDEX: 34.06 KG/M2 | TEMPERATURE: 97.7 F | DIASTOLIC BLOOD PRESSURE: 77 MMHG | HEART RATE: 62 BPM

## 2021-05-11 DIAGNOSIS — M17.11 UNILATERAL PRIMARY OSTEOARTHRITIS, RIGHT KNEE: ICD-10-CM

## 2021-05-11 PROCEDURE — 99214 OFFICE O/P EST MOD 30 MIN: CPT

## 2021-05-11 PROCEDURE — 73562 X-RAY EXAM OF KNEE 3: CPT | Mod: 50

## 2021-05-14 ENCOUNTER — OUTPATIENT (OUTPATIENT)
Dept: OUTPATIENT SERVICES | Facility: HOSPITAL | Age: 81
LOS: 1 days | End: 2021-05-14
Payer: MEDICARE

## 2021-05-14 VITALS
OXYGEN SATURATION: 97 % | WEIGHT: 212.75 LBS | HEIGHT: 66 IN | RESPIRATION RATE: 16 BRPM | HEART RATE: 68 BPM | DIASTOLIC BLOOD PRESSURE: 78 MMHG | TEMPERATURE: 98 F | SYSTOLIC BLOOD PRESSURE: 149 MMHG

## 2021-05-14 DIAGNOSIS — Z95.2 PRESENCE OF PROSTHETIC HEART VALVE: Chronic | ICD-10-CM

## 2021-05-14 DIAGNOSIS — M17.11 UNILATERAL PRIMARY OSTEOARTHRITIS, RIGHT KNEE: ICD-10-CM

## 2021-05-14 DIAGNOSIS — Z98.89 OTHER SPECIFIED POSTPROCEDURAL STATES: Chronic | ICD-10-CM

## 2021-05-14 DIAGNOSIS — Z01.818 ENCOUNTER FOR OTHER PREPROCEDURAL EXAMINATION: ICD-10-CM

## 2021-05-14 LAB
ALBUMIN SERPL ELPH-MCNC: 4.1 G/DL — SIGNIFICANT CHANGE UP (ref 3.3–5)
ALP SERPL-CCNC: 94 U/L — SIGNIFICANT CHANGE UP (ref 30–120)
ALT FLD-CCNC: 36 U/L DA — SIGNIFICANT CHANGE UP (ref 10–60)
ANION GAP SERPL CALC-SCNC: 9 MMOL/L — SIGNIFICANT CHANGE UP (ref 5–17)
APTT BLD: 30.4 SEC — SIGNIFICANT CHANGE UP (ref 27.5–35.5)
AST SERPL-CCNC: 23 U/L — SIGNIFICANT CHANGE UP (ref 10–40)
BILIRUB SERPL-MCNC: 0.6 MG/DL — SIGNIFICANT CHANGE UP (ref 0.2–1.2)
BLD GP AB SCN SERPL QL: SIGNIFICANT CHANGE UP
BUN SERPL-MCNC: 30 MG/DL — HIGH (ref 7–23)
CALCIUM SERPL-MCNC: 9.7 MG/DL — SIGNIFICANT CHANGE UP (ref 8.4–10.5)
CHLORIDE SERPL-SCNC: 105 MMOL/L — SIGNIFICANT CHANGE UP (ref 96–108)
CO2 SERPL-SCNC: 28 MMOL/L — SIGNIFICANT CHANGE UP (ref 22–31)
CREAT SERPL-MCNC: 2.01 MG/DL — HIGH (ref 0.5–1.3)
GLUCOSE SERPL-MCNC: 137 MG/DL — HIGH (ref 70–99)
HCT VFR BLD CALC: 45.6 % — SIGNIFICANT CHANGE UP (ref 39–50)
HGB BLD-MCNC: 15.1 G/DL — SIGNIFICANT CHANGE UP (ref 13–17)
INR BLD: 1.1 RATIO — SIGNIFICANT CHANGE UP (ref 0.88–1.16)
MCHC RBC-ENTMCNC: 27.8 PG — SIGNIFICANT CHANGE UP (ref 27–34)
MCHC RBC-ENTMCNC: 33.1 GM/DL — SIGNIFICANT CHANGE UP (ref 32–36)
MCV RBC AUTO: 83.8 FL — SIGNIFICANT CHANGE UP (ref 80–100)
MRSA PCR RESULT.: SIGNIFICANT CHANGE UP
NRBC # BLD: 0 /100 WBCS — SIGNIFICANT CHANGE UP (ref 0–0)
PLATELET # BLD AUTO: 137 K/UL — LOW (ref 150–400)
POTASSIUM SERPL-MCNC: 4.3 MMOL/L — SIGNIFICANT CHANGE UP (ref 3.5–5.3)
POTASSIUM SERPL-SCNC: 4.3 MMOL/L — SIGNIFICANT CHANGE UP (ref 3.5–5.3)
PROT SERPL-MCNC: 7.3 G/DL — SIGNIFICANT CHANGE UP (ref 6–8.3)
PROTHROM AB SERPL-ACNC: 13.3 SEC — SIGNIFICANT CHANGE UP (ref 10.6–13.6)
RBC # BLD: 5.44 M/UL — SIGNIFICANT CHANGE UP (ref 4.2–5.8)
RBC # FLD: 21 % — HIGH (ref 10.3–14.5)
S AUREUS DNA NOSE QL NAA+PROBE: SIGNIFICANT CHANGE UP
SODIUM SERPL-SCNC: 142 MMOL/L — SIGNIFICANT CHANGE UP (ref 135–145)
WBC # BLD: 8.22 K/UL — SIGNIFICANT CHANGE UP (ref 3.8–10.5)
WBC # FLD AUTO: 8.22 K/UL — SIGNIFICANT CHANGE UP (ref 3.8–10.5)

## 2021-05-14 PROCEDURE — 93005 ELECTROCARDIOGRAM TRACING: CPT

## 2021-05-14 PROCEDURE — G0463: CPT

## 2021-05-14 PROCEDURE — 93010 ELECTROCARDIOGRAM REPORT: CPT

## 2021-05-14 RX ORDER — OMEGA-3 ACID ETHYL ESTERS 1 G
1500 CAPSULE ORAL
Qty: 0 | Refills: 0 | DISCHARGE

## 2021-05-14 RX ORDER — METOPROLOL TARTRATE 50 MG
1 TABLET ORAL
Qty: 0 | Refills: 0 | DISCHARGE

## 2021-05-14 RX ORDER — CHONDROITIN SULFATE A SODIUM 100 %
2 POWDER (GRAM) MISCELLANEOUS
Qty: 0 | Refills: 0 | DISCHARGE

## 2021-05-14 NOTE — H&P PST ADULT - OPHTHALMOLOGIC
- Can titrate off his clonidine, decrease to nightly for a few days and then discontinue.   - Continue with thiamine daily.   - Call clinic with any questions or concerns.      negative

## 2021-05-14 NOTE — H&P PST ADULT - NSICDXPASTSURGICALHX_GEN_ALL_CORE_FT
PAST SURGICAL HISTORY:  History of lithotripsy     S/P AVR (aortic valve replacement) 7/1/2016- porcine valve    S/P knee surgery bilateral torn meniscus

## 2021-05-14 NOTE — H&P PST ADULT - ASSESSMENT
79yo male patient scheduled for surgery on 06/02/2021. He will obtain Medical and Cardiac Clearances. He will be NPO as per Anesthesia and will take Amlodipine, Metoprolol and Lorazepam on AM of surgery with a sip of water. All pre-op instructions reviewed with patient. He denies any metal allergies. As per protocol, he will be screened for Covid19 on 5/31/21.

## 2021-05-14 NOTE — H&P PST ADULT - NSICDXPASTMEDICALHX_GEN_ALL_CORE_FT
PAST MEDICAL HISTORY:  2019 novel coronavirus disease (COVID-19) 12/2020- Hospitalized @ [facility] x5 days- not intubated    MIRA (acute kidney injury)     Anxiety     Class 1 obesity with body mass index (BMI) of 34.0 to 34.9 in adult     COVID-19 vaccine series completed Moderna- 2nd vaccine 3/2021    Essential hypertension     Gout     History of colon polyps     Hyperlipidemia, unspecified hyperlipidemia type     Nonrheumatic aortic valve stenosis     Renal calculi

## 2021-05-14 NOTE — H&P PST ADULT - MUSCULOSKELETAL
details… detailed exam right knee/no joint erythema/no joint warmth/no calf tenderness/decreased ROM/joint swelling/diminished strength

## 2021-05-14 NOTE — H&P PST ADULT - HISTORY OF PRESENT ILLNESS
81yo male patient with approximately 5yr history of progressively worsening right knee pain. He has had gel injections without improvement. He rates the pain at 8/10. He takes Aleve occasionally with some relief. He was told that TKR is recommended and presents today for PSTs.

## 2021-05-14 NOTE — H&P PST ADULT - NSICDXPROBLEM_GEN_ALL_CORE_FT
PROBLEM DIAGNOSES  Problem: Primary osteoarthritis of right knee  Assessment and Plan: RIGHT Total Knee Replacement on 06/02/2021.

## 2021-05-14 NOTE — H&P PST ADULT - NSICDXFAMILYHX_GEN_ALL_CORE_FT
FAMILY HISTORY:  Father  Still living? No  Family history of suicide, Age at diagnosis: Age Unknown    Mother  Still living? No  Family history of lung cancer, Age at diagnosis: Age Unknown    Sibling  Still living? Yes, Estimated age: Age Unknown  Family history of essential hypertension, Age at diagnosis: Age Unknown

## 2021-05-31 ENCOUNTER — OUTPATIENT (OUTPATIENT)
Dept: OUTPATIENT SERVICES | Facility: HOSPITAL | Age: 81
LOS: 1 days | End: 2021-05-31

## 2021-05-31 DIAGNOSIS — Z20.828 CONTACT WITH AND (SUSPECTED) EXPOSURE TO OTHER VIRAL COMMUNICABLE DISEASES: ICD-10-CM

## 2021-05-31 DIAGNOSIS — Z95.2 PRESENCE OF PROSTHETIC HEART VALVE: Chronic | ICD-10-CM

## 2021-05-31 DIAGNOSIS — Z98.89 OTHER SPECIFIED POSTPROCEDURAL STATES: Chronic | ICD-10-CM

## 2021-05-31 LAB — SARS-COV-2 RNA SPEC QL NAA+PROBE: SIGNIFICANT CHANGE UP

## 2021-06-01 PROBLEM — U07.1 COVID-19: Chronic | Status: ACTIVE | Noted: 2021-05-14

## 2021-06-01 PROBLEM — Z92.29 PERSONAL HISTORY OF OTHER DRUG THERAPY: Chronic | Status: ACTIVE | Noted: 2021-05-14

## 2021-06-01 PROBLEM — E66.9 OBESITY, UNSPECIFIED: Chronic | Status: ACTIVE | Noted: 2021-05-14

## 2021-06-01 NOTE — PATIENT PROFILE ADULT - NSPROIMPLANTSMEDDEV_GEN_A_NUR
LABS:    HCG Quantitative, Serum: 22.1: For pregnancy evaluation the reference values are as follows:  Negative: <5 mIU/mL  Indeterminate: 5-25 mIU/mL (suggest repeat testing in 72hrs)  Positive: >25 mIU/mL  Note: hCG results of false positive and false negative for pregnancy are  rare, but can occur with this, and other hCG tests. Myah- and  post-menopausal females may have mildly elevated hCG concentrations,  usually less than 14 mIU/mL, that are constant over time.  Weeks of pregnancy:           mIU/mL  ------------------         -------          3                                6 -      71          4                              10 -     750          5                             220 -   7,100          6                             160 -  32,000          7                3,700 - 164,000          8                          32,000 - 150,000          9                          64,000 - 151,000         10                         47,000 - 187,000         12                         28,000 - 211,000         14                         14,000 -  63,000         15                         12,000 -  71,000         16 - 18                   8,000 -  58,000 mIU/mL (01.15.21 @ 14:30)                 14.3   10.37 )-----------( 439      ( 15 Dennis 2021 14:30 )             44.2     01-15    140  |  102  |  12  ----------------------------<  106<H>  4.0   |  25  |  0.77    Ca    9.6      15 Dennis 2021 14:30    TPro  7.7  /  Alb  4.6  /  TBili  0.2  /  DBili  x   /  AST  16  /  ALT  23  /  AlkPhos  91  01-15    PT/INR - ( 15 Dennis 2021 14:30 )   PT: 11.9 sec;   INR: 1.05 ratio         PTT - ( 15 Dennis 2021 14:30 )  PTT:34.5 sec  Urinalysis Basic - ( 15 Dennis 2021 15:16 )    Color: Dark Brown / Appearance: Turbid / S.033 / pH: x  Gluc: x / Ketone: Negative  / Bili: Negative / Urobili: <2 mg/dL   Blood: x / Protein: 30 mg/dL / Nitrite: Negative   Leuk Esterase: Moderate / RBC: tntc /HPF / WBC 3-5 /HPF   Sq Epi: x / Non Sq Epi: small /HPF / Bacteria: x        RADIOLOGY & ADDITIONAL STUDIES:  < from: US Transvaginal, OB (01.15.21 @ 17:04) >  FINDINGS:    Uterus: 6.6 cm x 5.0 cm x 4.1 cm. No intrauterine gestation is identified. Nonspecific echogenic structure in the myometrium measures 3 mm, possibly small calcification.  Endometrium: 7. Within normal limits.    Right ovary: 3.3 cm x 1.8 cm x 2.2 cm. A right corpus luteum measures 1.2 cm.I n the right adnexa, an extraovarian adnexal mass measures 2.6 x 2.6 x 2.5 cm with adjacent hemorrhagic products, concerning for ectopic pregnancy. No fetal pole or embryonic heart rate is identified.    Left ovary: 2.8 cm x 1.4 cm x 1.5 cm. Within normal limits.    Fluid: None.    IMPRESSION:  Right extraovarian adnexal mass measuring 2.6 cm, concerning for ectopic pregnancy. No fetal pole or embryonic heart rate is identified.  Small amount of hemorrhagic fluid in the right adnexa.    Findings were discussed with Dr. HENRY KING [id number] 1/15/2021 5:02 PM by Dr. Craven with read back confirmation.              ROBE CRAVEN MD; Attending Radiologist  This document has been electronically signed. Dennis 15 2021  5:05PM    < end of copied text >
None

## 2021-06-01 NOTE — PATIENT PROFILE ADULT - NSTOBACCONEVERSMOKERY/N_GEN_A
OKAY TO REFILL    I have reviewed information from the [facility]IT[facility]ING PRO[facility]:  Reports are compliant with drug, quantity, prescribe, dispenser, and recipient history.Treatment is appropriate.    LAST REFILL :08/11/17    DONE   No

## 2021-06-02 ENCOUNTER — INPATIENT (INPATIENT)
Facility: HOSPITAL | Age: 81
LOS: 0 days | Discharge: HOME | DRG: 470 | End: 2021-06-03
Attending: ORTHOPAEDIC SURGERY | Admitting: ORTHOPAEDIC SURGERY
Payer: COMMERCIAL

## 2021-06-02 ENCOUNTER — APPOINTMENT (OUTPATIENT)
Dept: ORTHOPEDIC SURGERY | Facility: HOSPITAL | Age: 81
End: 2021-06-02

## 2021-06-02 ENCOUNTER — RESULT REVIEW (OUTPATIENT)
Age: 81
End: 2021-06-02

## 2021-06-02 ENCOUNTER — TRANSCRIPTION ENCOUNTER (OUTPATIENT)
Age: 81
End: 2021-06-02

## 2021-06-02 VITALS
OXYGEN SATURATION: 99 % | HEIGHT: 65 IN | SYSTOLIC BLOOD PRESSURE: 150 MMHG | DIASTOLIC BLOOD PRESSURE: 80 MMHG | TEMPERATURE: 98 F | HEART RATE: 68 BPM | RESPIRATION RATE: 16 BRPM

## 2021-06-02 DIAGNOSIS — M17.11 UNILATERAL PRIMARY OSTEOARTHRITIS, RIGHT KNEE: ICD-10-CM

## 2021-06-02 DIAGNOSIS — Z98.89 OTHER SPECIFIED POSTPROCEDURAL STATES: Chronic | ICD-10-CM

## 2021-06-02 DIAGNOSIS — Z01.818 ENCOUNTER FOR OTHER PREPROCEDURAL EXAMINATION: ICD-10-CM

## 2021-06-02 DIAGNOSIS — Z95.2 PRESENCE OF PROSTHETIC HEART VALVE: Chronic | ICD-10-CM

## 2021-06-02 LAB
ANION GAP SERPL CALC-SCNC: 15 MMOL/L — SIGNIFICANT CHANGE UP (ref 5–17)
BUN SERPL-MCNC: 21 MG/DL — SIGNIFICANT CHANGE UP (ref 7–23)
CALCIUM SERPL-MCNC: 8.9 MG/DL — SIGNIFICANT CHANGE UP (ref 8.4–10.5)
CHLORIDE SERPL-SCNC: 103 MMOL/L — SIGNIFICANT CHANGE UP (ref 96–108)
CO2 SERPL-SCNC: 19 MMOL/L — LOW (ref 22–31)
CREAT SERPL-MCNC: 1.85 MG/DL — HIGH (ref 0.5–1.3)
GLUCOSE SERPL-MCNC: 266 MG/DL — HIGH (ref 70–99)
HCT VFR BLD CALC: 42.2 % — SIGNIFICANT CHANGE UP (ref 39–50)
HGB BLD-MCNC: 13.9 G/DL — SIGNIFICANT CHANGE UP (ref 13–17)
MCHC RBC-ENTMCNC: 28.1 PG — SIGNIFICANT CHANGE UP (ref 27–34)
MCHC RBC-ENTMCNC: 32.9 GM/DL — SIGNIFICANT CHANGE UP (ref 32–36)
MCV RBC AUTO: 85.3 FL — SIGNIFICANT CHANGE UP (ref 80–100)
NRBC # BLD: 0 /100 WBCS — SIGNIFICANT CHANGE UP (ref 0–0)
PLATELET # BLD AUTO: 124 K/UL — LOW (ref 150–400)
POTASSIUM SERPL-MCNC: 3.9 MMOL/L — SIGNIFICANT CHANGE UP (ref 3.5–5.3)
POTASSIUM SERPL-SCNC: 3.9 MMOL/L — SIGNIFICANT CHANGE UP (ref 3.5–5.3)
RBC # BLD: 4.95 M/UL — SIGNIFICANT CHANGE UP (ref 4.2–5.8)
RBC # FLD: 19.4 % — HIGH (ref 10.3–14.5)
SODIUM SERPL-SCNC: 137 MMOL/L — SIGNIFICANT CHANGE UP (ref 135–145)
WBC # BLD: 10.49 K/UL — SIGNIFICANT CHANGE UP (ref 3.8–10.5)
WBC # FLD AUTO: 10.49 K/UL — SIGNIFICANT CHANGE UP (ref 3.8–10.5)

## 2021-06-02 PROCEDURE — 99222 1ST HOSP IP/OBS MODERATE 55: CPT

## 2021-06-02 PROCEDURE — 88305 TISSUE EXAM BY PATHOLOGIST: CPT | Mod: 26

## 2021-06-02 PROCEDURE — 27447 TOTAL KNEE ARTHROPLASTY: CPT | Mod: RT

## 2021-06-02 PROCEDURE — 73560 X-RAY EXAM OF KNEE 1 OR 2: CPT | Mod: 26,RT

## 2021-06-02 PROCEDURE — 88311 DECALCIFY TISSUE: CPT | Mod: 26

## 2021-06-02 RX ORDER — ACETAMINOPHEN 500 MG
1000 TABLET ORAL ONCE
Refills: 0 | Status: COMPLETED | OUTPATIENT
Start: 2021-06-02 | End: 2021-06-02

## 2021-06-02 RX ORDER — ALLOPURINOL 300 MG
300 TABLET ORAL DAILY
Refills: 0 | Status: DISCONTINUED | OUTPATIENT
Start: 2021-06-02 | End: 2021-06-03

## 2021-06-02 RX ORDER — CEFAZOLIN SODIUM 1 G
2000 VIAL (EA) INJECTION EVERY 8 HOURS
Refills: 0 | Status: COMPLETED | OUTPATIENT
Start: 2021-06-02 | End: 2021-06-03

## 2021-06-02 RX ORDER — METOPROLOL TARTRATE 50 MG
50 TABLET ORAL DAILY
Refills: 0 | Status: DISCONTINUED | OUTPATIENT
Start: 2021-06-02 | End: 2021-06-02

## 2021-06-02 RX ORDER — ONDANSETRON 8 MG/1
4 TABLET, FILM COATED ORAL ONCE
Refills: 0 | Status: DISCONTINUED | OUTPATIENT
Start: 2021-06-02 | End: 2021-06-02

## 2021-06-02 RX ORDER — ACETAMINOPHEN 500 MG
1000 TABLET ORAL EVERY 8 HOURS
Refills: 0 | Status: DISCONTINUED | OUTPATIENT
Start: 2021-06-03 | End: 2021-06-03

## 2021-06-02 RX ORDER — METOPROLOL TARTRATE 50 MG
50 TABLET ORAL DAILY
Refills: 0 | Status: DISCONTINUED | OUTPATIENT
Start: 2021-06-02 | End: 2021-06-03

## 2021-06-02 RX ORDER — CHOLECALCIFEROL (VITAMIN D3) 125 MCG
5000 CAPSULE ORAL DAILY
Refills: 0 | Status: DISCONTINUED | OUTPATIENT
Start: 2021-06-02 | End: 2021-06-02

## 2021-06-02 RX ORDER — AMLODIPINE BESYLATE 2.5 MG/1
5 TABLET ORAL DAILY
Refills: 0 | Status: DISCONTINUED | OUTPATIENT
Start: 2021-06-04 | End: 2021-06-03

## 2021-06-02 RX ORDER — CEFAZOLIN SODIUM 1 G
2000 VIAL (EA) INJECTION ONCE
Refills: 0 | Status: COMPLETED | OUTPATIENT
Start: 2021-06-02 | End: 2021-06-02

## 2021-06-02 RX ORDER — ALLOPURINOL 300 MG
300 TABLET ORAL DAILY
Refills: 0 | Status: DISCONTINUED | OUTPATIENT
Start: 2021-06-02 | End: 2021-06-02

## 2021-06-02 RX ORDER — ASPIRIN/CALCIUM CARB/MAGNESIUM 324 MG
1 TABLET ORAL
Qty: 83 | Refills: 0
Start: 2021-06-02 | End: 2021-07-13

## 2021-06-02 RX ORDER — SIMVASTATIN 20 MG/1
40 TABLET, FILM COATED ORAL AT BEDTIME
Refills: 0 | Status: DISCONTINUED | OUTPATIENT
Start: 2021-06-02 | End: 2021-06-02

## 2021-06-02 RX ORDER — SODIUM CHLORIDE 9 MG/ML
250 INJECTION INTRAMUSCULAR; INTRAVENOUS; SUBCUTANEOUS ONCE
Refills: 0 | Status: COMPLETED | OUTPATIENT
Start: 2021-06-02 | End: 2021-06-02

## 2021-06-02 RX ORDER — ASPIRIN/CALCIUM CARB/MAGNESIUM 324 MG
81 TABLET ORAL EVERY 12 HOURS
Refills: 0 | Status: DISCONTINUED | OUTPATIENT
Start: 2021-06-03 | End: 2021-06-03

## 2021-06-02 RX ORDER — SODIUM CHLORIDE 9 MG/ML
500 INJECTION INTRAMUSCULAR; INTRAVENOUS; SUBCUTANEOUS ONCE
Refills: 0 | Status: COMPLETED | OUTPATIENT
Start: 2021-06-02 | End: 2021-06-02

## 2021-06-02 RX ORDER — OMEPRAZOLE 10 MG/1
1 CAPSULE, DELAYED RELEASE ORAL
Qty: 30 | Refills: 1
Start: 2021-06-02 | End: 2021-07-31

## 2021-06-02 RX ORDER — APREPITANT 80 MG/1
40 CAPSULE ORAL ONCE
Refills: 0 | Status: COMPLETED | OUTPATIENT
Start: 2021-06-02 | End: 2021-06-02

## 2021-06-02 RX ORDER — TRANEXAMIC ACID 100 MG/ML
1000 INJECTION, SOLUTION INTRAVENOUS ONCE
Refills: 0 | Status: COMPLETED | OUTPATIENT
Start: 2021-06-02 | End: 2021-06-02

## 2021-06-02 RX ORDER — SENNA PLUS 8.6 MG/1
2 TABLET ORAL AT BEDTIME
Refills: 0 | Status: DISCONTINUED | OUTPATIENT
Start: 2021-06-02 | End: 2021-06-03

## 2021-06-02 RX ORDER — OXYCODONE HYDROCHLORIDE 5 MG/1
10 TABLET ORAL
Refills: 0 | Status: DISCONTINUED | OUTPATIENT
Start: 2021-06-02 | End: 2021-06-03

## 2021-06-02 RX ORDER — HYDROMORPHONE HYDROCHLORIDE 2 MG/ML
0.5 INJECTION INTRAMUSCULAR; INTRAVENOUS; SUBCUTANEOUS
Refills: 0 | Status: DISCONTINUED | OUTPATIENT
Start: 2021-06-02 | End: 2021-06-03

## 2021-06-02 RX ORDER — ACETAMINOPHEN 500 MG
1000 TABLET ORAL ONCE
Refills: 0 | Status: COMPLETED | OUTPATIENT
Start: 2021-06-03 | End: 2021-06-03

## 2021-06-02 RX ORDER — HYDROMORPHONE HYDROCHLORIDE 2 MG/ML
0.5 INJECTION INTRAMUSCULAR; INTRAVENOUS; SUBCUTANEOUS
Refills: 0 | Status: DISCONTINUED | OUTPATIENT
Start: 2021-06-02 | End: 2021-06-02

## 2021-06-02 RX ORDER — SIMVASTATIN 20 MG/1
40 TABLET, FILM COATED ORAL AT BEDTIME
Refills: 0 | Status: DISCONTINUED | OUTPATIENT
Start: 2021-06-02 | End: 2021-06-03

## 2021-06-02 RX ORDER — POLYETHYLENE GLYCOL 3350 17 G/17G
17 POWDER, FOR SOLUTION ORAL AT BEDTIME
Refills: 0 | Status: DISCONTINUED | OUTPATIENT
Start: 2021-06-02 | End: 2021-06-03

## 2021-06-02 RX ORDER — CHLORHEXIDINE GLUCONATE 213 G/1000ML
1 SOLUTION TOPICAL ONCE
Refills: 0 | Status: COMPLETED | OUTPATIENT
Start: 2021-06-02 | End: 2021-06-02

## 2021-06-02 RX ORDER — PANTOPRAZOLE SODIUM 20 MG/1
40 TABLET, DELAYED RELEASE ORAL
Refills: 0 | Status: DISCONTINUED | OUTPATIENT
Start: 2021-06-02 | End: 2021-06-03

## 2021-06-02 RX ORDER — MAGNESIUM HYDROXIDE 400 MG/1
30 TABLET, CHEWABLE ORAL DAILY
Refills: 0 | Status: DISCONTINUED | OUTPATIENT
Start: 2021-06-02 | End: 2021-06-03

## 2021-06-02 RX ORDER — SODIUM CHLORIDE 9 MG/ML
1000 INJECTION, SOLUTION INTRAVENOUS
Refills: 0 | Status: DISCONTINUED | OUTPATIENT
Start: 2021-06-02 | End: 2021-06-03

## 2021-06-02 RX ORDER — AMLODIPINE BESYLATE 2.5 MG/1
5 TABLET ORAL DAILY
Refills: 0 | Status: CANCELLED | OUTPATIENT
Start: 2021-06-04 | End: 2021-06-02

## 2021-06-02 RX ORDER — OXYCODONE HYDROCHLORIDE 5 MG/1
5 TABLET ORAL
Refills: 0 | Status: DISCONTINUED | OUTPATIENT
Start: 2021-06-02 | End: 2021-06-03

## 2021-06-02 RX ORDER — SODIUM CHLORIDE 9 MG/ML
1000 INJECTION, SOLUTION INTRAVENOUS
Refills: 0 | Status: DISCONTINUED | OUTPATIENT
Start: 2021-06-02 | End: 2021-06-02

## 2021-06-02 RX ORDER — ONDANSETRON 8 MG/1
4 TABLET, FILM COATED ORAL EVERY 6 HOURS
Refills: 0 | Status: DISCONTINUED | OUTPATIENT
Start: 2021-06-02 | End: 2021-06-03

## 2021-06-02 RX ADMIN — APREPITANT 40 MILLIGRAM(S): 80 CAPSULE ORAL at 11:37

## 2021-06-02 RX ADMIN — Medication 100 MILLIGRAM(S): at 23:44

## 2021-06-02 RX ADMIN — Medication 400 MILLIGRAM(S): at 19:52

## 2021-06-02 RX ADMIN — POLYETHYLENE GLYCOL 3350 17 GRAM(S): 17 POWDER, FOR SOLUTION ORAL at 21:14

## 2021-06-02 RX ADMIN — SIMVASTATIN 40 MILLIGRAM(S): 20 TABLET, FILM COATED ORAL at 21:14

## 2021-06-02 RX ADMIN — SODIUM CHLORIDE 75 MILLILITER(S): 9 INJECTION, SOLUTION INTRAVENOUS at 16:18

## 2021-06-02 RX ADMIN — SODIUM CHLORIDE 100 MILLILITER(S): 9 INJECTION, SOLUTION INTRAVENOUS at 23:44

## 2021-06-02 RX ADMIN — Medication 1000 MILLIGRAM(S): at 19:52

## 2021-06-02 RX ADMIN — SODIUM CHLORIDE 250 MILLILITER(S): 9 INJECTION INTRAMUSCULAR; INTRAVENOUS; SUBCUTANEOUS at 18:36

## 2021-06-02 RX ADMIN — SENNA PLUS 2 TABLET(S): 8.6 TABLET ORAL at 21:14

## 2021-06-02 RX ADMIN — CHLORHEXIDINE GLUCONATE 1 APPLICATION(S): 213 SOLUTION TOPICAL at 11:37

## 2021-06-02 RX ADMIN — SODIUM CHLORIDE 500 MILLILITER(S): 9 INJECTION INTRAMUSCULAR; INTRAVENOUS; SUBCUTANEOUS at 16:19

## 2021-06-02 NOTE — PROGRESS NOTE ADULT - ASSESSMENT
81yo male pmh HTN, aortic valve stenosis, anxiety, hld, gout is s/p Right TKR    #S/P Right TKR  Post op orders per ortho  Pain management  Bowl regimen  Pt eval  Obtain baseline labs    #HTN  hold diuretics  continue home meds as indicated    #Anxiety  continue home meds as indicated    #HLD  continue statins    #DVT Proph  per ortho

## 2021-06-02 NOTE — DISCHARGE NOTE PROVIDER - NSDCCPTREATMENT_GEN_ALL_CORE_FT
PRINCIPAL PROCEDURE  Procedure: Right total knee replacement  Findings and Treatment: Right knee severe DJD

## 2021-06-02 NOTE — DISCHARGE NOTE PROVIDER - CARE PROVIDER_API CALL
Preethi Garcia (NP; RN)  NP in Adult Health  [address] [facility], [address]  [place] [address]  Phone: [phone]  Fax: [phone]  Established Patient  Scheduled Appointment: 06/17/2021 11:30 AM

## 2021-06-02 NOTE — DISCHARGE NOTE PROVIDER - NSDCMRMEDTOKEN_GEN_ALL_CORE_FT
Aleve 220 mg oral tablet: 2 tab(s) orally once a day, As Needed  allopurinol 300 mg oral tablet: 1 tab(s) orally once a day  amLODIPine 5 mg oral tablet: 1 tab(s) orally once a day (in the morning)  aspirin 81 mg oral delayed release tablet: 1 tab orally every 12 hours.  aspirin 81 mg oral tablet: 1 tab(s) orally once a day  furosemide 20 mg oral tablet: 1 tab(s) orally once a day (in the morning)  LORazepam 0.5 mg oral tablet: 1 tab(s) orally 2 times a day  Metoprolol Tartrate 50 mg oral tablet: 1 tab(s) orally once a day  multivitamin: 1 tab(s) orally once a day  omeprazole 20 mg oral delayed release capsule: 1 cap orally once a day   potassium chloride 10 mEq oral tablet, extended release: 1 tab(s) orally once a day (in the morning)  simvastatin 40 mg oral tablet: 1 tab(s) orally once a day (at bedtime)  Vitamin B-12 1000 mcg oral tablet: 1 tab(s) orally once a day  Vitamin D3 5000 intl units (125 mcg) oral capsule: 1 cap(s) orally once a day   acetaminophen 500 mg oral tablet: 2 tab(s) orally every 8 hours  allopurinol 300 mg oral tablet: 1 tab(s) orally once a day  amLODIPine 5 mg oral tablet: 1 tab(s) orally once a day (in the morning)  aspirin 81 mg oral delayed release tablet: 1 tab orally every 12 hours.  furosemide 20 mg oral tablet: 1 tab(s) orally once a day (in the morning)  LORazepam 0.5 mg oral tablet: 1 tab(s) orally 2 times a day  Metoprolol Tartrate 50 mg oral tablet: 1 tab(s) orally once a day  multivitamin: 1 tab(s) orally once a day  omeprazole 20 mg oral delayed release capsule: 1 cap orally once a day   oxyCODONE 5 mg oral tablet: 1-2 tab(s) orally every 4 hours, As Needed -for severe pain MDD:6  potassium chloride 10 mEq oral tablet, extended release: 1 tab(s) orally once a day (in the morning)  simvastatin 40 mg oral tablet: 1 tab(s) orally once a day (at bedtime)  Vitamin B-12 1000 mcg oral tablet: 1 tab(s) orally once a day  Vitamin D3 5000 intl units (125 mcg) oral capsule: 1 cap(s) orally once a day   acetaminophen 500 mg oral tablet: 2 tab(s) orally every 8 hours  allopurinol 300 mg oral tablet: 1 tab(s) orally once a day  amLODIPine 5 mg oral tablet: 1 tab(s) orally once a day (in the morning)  aspirin 81 mg oral delayed release tablet: 1 tab orally every 12 hours.  furosemide 20 mg oral tablet: 1 tab(s) orally once a day (in the morning)  LORazepam 0.5 mg oral tablet: 1 tab(s) orally 2 times a day  Metoprolol Succinate ER 50 mg oral tablet, extended release: 1 tab(s) orally once a day  multivitamin: 1 tab(s) orally once a day  omeprazole 20 mg oral delayed release capsule: 1 cap orally once a day   oxyCODONE 5 mg oral tablet: 1-2 tab(s) orally every 4 hours, As Needed -for severe pain MDD:6  potassium chloride 10 mEq oral tablet, extended release: 1 tab(s) orally once a day (in the morning)  simvastatin 40 mg oral tablet: 1 tab(s) orally once a day (at bedtime)  Vitamin B-12 1000 mcg oral tablet: 1 tab(s) orally once a day  Vitamin D3 5000 intl units (125 mcg) oral capsule: 1 cap(s) orally once a day   acetaminophen 500 mg oral tablet: 2 tab(s) orally every 8 hours  allopurinol 300 mg oral tablet: 1 tab(s) orally once a day  amLODIPine 5 mg oral tablet: 1 tab(s) orally once a day (in the morning)  aspirin 81 mg oral delayed release tablet: 1 tab orally every 12 hours.  furosemide 20 mg oral tablet: 1 tab(s) orally once a day (in the morning)  LORazepam 0.5 mg oral tablet: 1 tab(s) orally 2 times a day  multivitamin: 1 tab(s) orally once a day  omeprazole 20 mg oral delayed release capsule: 1 cap orally once a day   oxyCODONE 5 mg oral tablet: 1-2 tab(s) orally every 4 hours, As Needed -for severe pain MDD:6  polyethylene glycol 3350 oral powder for reconstitution: 17 gram(s) orally once a day (at bedtime)  potassium chloride 10 mEq oral tablet, extended release: 1 tab(s) orally once a day (in the morning)  senna oral tablet: 2 tab(s) orally once a day (at bedtime)  simvastatin 40 mg oral tablet: 1 tab(s) orally once a day (at bedtime)  Vitamin B-12 1000 mcg oral tablet: 1 tab(s) orally once a day  Vitamin D3 5000 intl units (125 mcg) oral capsule: 1 cap(s) orally once a day

## 2021-06-02 NOTE — PHYSICAL THERAPY INITIAL EVALUATION ADULT - GAIT DEVIATIONS NOTED, PT EVAL
mild right knee buckling at times/decreased richie/decreased velocity of limb motion/decreased step length/decreased stride length/decreased weight-shifting ability

## 2021-06-02 NOTE — PHYSICAL THERAPY INITIAL EVALUATION ADULT - RANGE OF MOTION EXAMINATION, REHAB EVAL
right knee flexion 60 deg in sitting/Left LE ROM was WFL (within functional limits)/deficits as listed below

## 2021-06-02 NOTE — DISCHARGE NOTE PROVIDER - HOSPITAL COURSE
This patient was admitted to [facility] with a history of severe degenerative joint disease of the right knee.  Patient underwent Pre-Surgical Testing and was medically cleared to undergo elective procedure. Patient underwent Right TKR by Dr. Brown Kruger on 6/2/21  . Procedure was well tolerated.  No operative or will-operative complications arose during patient's hospital course.  Patient received antibiotic according to SCIP guidelines for infection prevention.  Aspirin 81mg q 12h was given for DVT prophylaxis, in addition to the use of SCDs.  Anesthesia, Medical Hospitalist, Physical Therapy and Occupational Therapy were consulted. Patient is stable for discharge with a good prognosis.  Appropriate discharge instructions and medications are provided in this document.

## 2021-06-02 NOTE — DISCHARGE NOTE PROVIDER - PROVIDER TOKENS
PROVIDER:[TOKEN:[[id number]],SCHEDULEDAPPT:[06/17/2021],SCHEDULEDAPPTTIME:[11:30 AM],ESTABLISHEDPATIENT:[T]]

## 2021-06-02 NOTE — PHYSICAL THERAPY INITIAL EVALUATION ADULT - ADDITIONAL COMMENTS
Pt lives with spouse in a house w/ 1 step to enter, 3+5 steps with rail inside.  Pt has rolling walker

## 2021-06-02 NOTE — BRIEF OPERATIVE NOTE - NSICDXBRIEFPROCEDURE_GEN_ALL_CORE_FT
PROCEDURES:  Right total knee replacement 02-Jun-2021 15:56:11  Luz Maria Chong  Injection, cortisone 02-Jun-2021 15:56:54 left knee Luz Maria Chong

## 2021-06-02 NOTE — DISCHARGE NOTE PROVIDER - NSDCFUSCHEDAPPT_GEN_ALL_CORE_FT
DOROTHEA VASQUEZ ; 06/17/2021 ; [address]  DOROTHEA VASQUEZ ; 08/03/2021 ; [address]

## 2021-06-02 NOTE — PHYSICAL THERAPY INITIAL EVALUATION ADULT - GAIT TRAINING, PT EVAL
Goals 1-3 days, Pt will ambulate 150 ft w/ rolling walker independently.    Pt will negotiate 10 steps with rail and straight cane with supervision

## 2021-06-03 ENCOUNTER — TRANSCRIPTION ENCOUNTER (OUTPATIENT)
Age: 81
End: 2021-06-03

## 2021-06-03 VITALS
RESPIRATION RATE: 18 BRPM | OXYGEN SATURATION: 96 % | DIASTOLIC BLOOD PRESSURE: 72 MMHG | HEART RATE: 72 BPM | SYSTOLIC BLOOD PRESSURE: 125 MMHG | TEMPERATURE: 98 F

## 2021-06-03 LAB
ANION GAP SERPL CALC-SCNC: 9 MMOL/L — SIGNIFICANT CHANGE UP (ref 5–17)
BUN SERPL-MCNC: 20 MG/DL — SIGNIFICANT CHANGE UP (ref 7–23)
CALCIUM SERPL-MCNC: 9.4 MG/DL — SIGNIFICANT CHANGE UP (ref 8.4–10.5)
CHLORIDE SERPL-SCNC: 105 MMOL/L — SIGNIFICANT CHANGE UP (ref 96–108)
CO2 SERPL-SCNC: 23 MMOL/L — SIGNIFICANT CHANGE UP (ref 22–31)
COVID-19 SPIKE DOMAIN AB INTERP: POSITIVE
COVID-19 SPIKE DOMAIN ANTIBODY RESULT: >250 U/ML — HIGH
CREAT SERPL-MCNC: 1.52 MG/DL — HIGH (ref 0.5–1.3)
GLUCOSE SERPL-MCNC: 185 MG/DL — HIGH (ref 70–99)
HCT VFR BLD CALC: 43.5 % — SIGNIFICANT CHANGE UP (ref 39–50)
HGB BLD-MCNC: 14.4 G/DL — SIGNIFICANT CHANGE UP (ref 13–17)
MCHC RBC-ENTMCNC: 28.3 PG — SIGNIFICANT CHANGE UP (ref 27–34)
MCHC RBC-ENTMCNC: 33.1 GM/DL — SIGNIFICANT CHANGE UP (ref 32–36)
MCV RBC AUTO: 85.5 FL — SIGNIFICANT CHANGE UP (ref 80–100)
NRBC # BLD: 0 /100 WBCS — SIGNIFICANT CHANGE UP (ref 0–0)
PLATELET # BLD AUTO: 116 K/UL — LOW (ref 150–400)
POTASSIUM SERPL-MCNC: 4.5 MMOL/L — SIGNIFICANT CHANGE UP (ref 3.5–5.3)
POTASSIUM SERPL-SCNC: 4.5 MMOL/L — SIGNIFICANT CHANGE UP (ref 3.5–5.3)
RBC # BLD: 5.09 M/UL — SIGNIFICANT CHANGE UP (ref 4.2–5.8)
RBC # FLD: 19.1 % — HIGH (ref 10.3–14.5)
SARS-COV-2 IGG+IGM SERPL QL IA: >250 U/ML — HIGH
SARS-COV-2 IGG+IGM SERPL QL IA: POSITIVE
SODIUM SERPL-SCNC: 137 MMOL/L — SIGNIFICANT CHANGE UP (ref 135–145)
WBC # BLD: 16.72 K/UL — HIGH (ref 3.8–10.5)
WBC # FLD AUTO: 16.72 K/UL — HIGH (ref 3.8–10.5)

## 2021-06-03 PROCEDURE — C1776: CPT

## 2021-06-03 PROCEDURE — 85027 COMPLETE CBC AUTOMATED: CPT

## 2021-06-03 PROCEDURE — 88311 DECALCIFY TISSUE: CPT

## 2021-06-03 PROCEDURE — 94664 DEMO&/EVAL PT USE INHALER: CPT

## 2021-06-03 PROCEDURE — 88305 TISSUE EXAM BY PATHOLOGIST: CPT

## 2021-06-03 PROCEDURE — C1713: CPT

## 2021-06-03 PROCEDURE — 97165 OT EVAL LOW COMPLEX 30 MIN: CPT

## 2021-06-03 PROCEDURE — 80048 BASIC METABOLIC PNL TOTAL CA: CPT

## 2021-06-03 PROCEDURE — 99232 SBSQ HOSP IP/OBS MODERATE 35: CPT

## 2021-06-03 PROCEDURE — 97530 THERAPEUTIC ACTIVITIES: CPT

## 2021-06-03 PROCEDURE — 36415 COLL VENOUS BLD VENIPUNCTURE: CPT

## 2021-06-03 PROCEDURE — U0003: CPT

## 2021-06-03 PROCEDURE — 27447 TOTAL KNEE ARTHROPLASTY: CPT

## 2021-06-03 PROCEDURE — 97535 SELF CARE MNGMENT TRAINING: CPT

## 2021-06-03 PROCEDURE — 86769 SARS-COV-2 COVID-19 ANTIBODY: CPT

## 2021-06-03 PROCEDURE — 97116 GAIT TRAINING THERAPY: CPT

## 2021-06-03 PROCEDURE — C1889: CPT

## 2021-06-03 PROCEDURE — 97110 THERAPEUTIC EXERCISES: CPT

## 2021-06-03 PROCEDURE — 97161 PT EVAL LOW COMPLEX 20 MIN: CPT

## 2021-06-03 PROCEDURE — U0005: CPT

## 2021-06-03 PROCEDURE — 73560 X-RAY EXAM OF KNEE 1 OR 2: CPT

## 2021-06-03 RX ORDER — ASPIRIN/CALCIUM CARB/MAGNESIUM 324 MG
1 TABLET ORAL
Qty: 0 | Refills: 0 | DISCHARGE

## 2021-06-03 RX ORDER — METOPROLOL TARTRATE 50 MG
1 TABLET ORAL
Qty: 0 | Refills: 0 | DISCHARGE

## 2021-06-03 RX ORDER — OXYCODONE HYDROCHLORIDE 5 MG/1
1 TABLET ORAL
Qty: 42 | Refills: 0
Start: 2021-06-03 | End: 2021-06-09

## 2021-06-03 RX ORDER — POLYETHYLENE GLYCOL 3350 17 G/17G
17 POWDER, FOR SOLUTION ORAL
Qty: 0 | Refills: 0 | DISCHARGE
Start: 2021-06-03

## 2021-06-03 RX ORDER — ACETAMINOPHEN 500 MG
2 TABLET ORAL
Qty: 0 | Refills: 0 | DISCHARGE
Start: 2021-06-03

## 2021-06-03 RX ORDER — SENNA PLUS 8.6 MG/1
2 TABLET ORAL
Qty: 0 | Refills: 0 | DISCHARGE
Start: 2021-06-03

## 2021-06-03 RX ADMIN — Medication 50 MILLIGRAM(S): at 05:28

## 2021-06-03 RX ADMIN — Medication 1000 MILLIGRAM(S): at 09:19

## 2021-06-03 RX ADMIN — Medication 1000 MILLIGRAM(S): at 02:15

## 2021-06-03 RX ADMIN — PANTOPRAZOLE SODIUM 40 MILLIGRAM(S): 20 TABLET, DELAYED RELEASE ORAL at 05:28

## 2021-06-03 RX ADMIN — Medication 100 MILLIGRAM(S): at 07:15

## 2021-06-03 RX ADMIN — Medication 81 MILLIGRAM(S): at 05:28

## 2021-06-03 RX ADMIN — Medication 400 MILLIGRAM(S): at 02:00

## 2021-06-03 RX ADMIN — Medication 300 MILLIGRAM(S): at 09:18

## 2021-06-03 NOTE — OCCUPATIONAL THERAPY INITIAL EVALUATION ADULT - ADDITIONAL COMMENTS
Pt lives in private home with his wife. 1 HENOK 5 steps inside to bedroom and bathroom. +tub with grab bars. Pt owns a RW, SAC, and RTS. Pt was independent with ADLs prior to surgery and performed functional mobility without any AD.

## 2021-06-03 NOTE — PROGRESS NOTE ADULT - ASSESSMENT
79yo male pmh HTN, aortic valve stenosis, anxiety, hld, gout is s/p Right TKR    #S/P Right TKR  Post op orders per ortho  Pain management  Bowl regimen  Pt eval  labs reviewed    #HTN  hold diuretics  continue home meds as indicated    #Anxiety  continue home meds as indicated    #HLD  continue statins    #DVT Proph  per ortho

## 2021-06-03 NOTE — PROGRESS NOTE ADULT - SUBJECTIVE AND OBJECTIVE BOX
Discharge medication calendar:  (ASA 81mg Qday preop)  Aspirin EC 81mg q12h x 6 weeks then resume ASA 81mg Qday  APAP 1000mg q8h x 2-3 weeks  No NSAID (CKD)  Omeprazole 20mg QAM x 6 weeks  Narcotic PRN  Docusate 100mg TID while taking narcotic  Miralax, Senna, or Bisacodyl PRN for treatment of constipation  
Patient is a 80y old  Male who presents with a chief complaint of Right TKR for severe right knee OA (02 Jun 2021 16:04)      HPI:    81yo male pmh HTN, aortic valve stenosis, anxiety, hld, gout is s/p Right TKR, prior to the procedure patient had approximately 5yr history of progressively worsening right knee pain.  He has had gel injections without improvement. He rates the pain at 8/10.  He took Aleve occasionally with some relief.   He was told that TKR was recommended.  Pt looking forward to recovery    REVIEW OF SYSTEMS:  CONSTITUTIONAL: No fever, weight loss, or fatigue  RESPIRATORY: No cough, wheezing, chills or hemoptysis; No shortness of breath  CARDIOVASCULAR: No chest pain, palpitations, dizziness, or leg swelling  GASTROINTESTINAL: No abdominal or epigastric pain. No nausea, vomiting, or hematemesis; No diarrhea or constipation. No melena or hematochezia.  GENITOURINARY: No dysuria, frequency, hematuria, or incontinence  NEUROLOGICAL: No headaches, memory loss, loss of strength, numbness, or tremors  MUSCULOSKELETAL: No muscle or back pain  PSYCHIATRIC: No depression, anxiety, mood swings, or difficulty sleeping      PAST MEDICAL & SURGICAL HISTORY:  Essential hypertension    Nonrheumatic aortic valve stenosis    Renal calculi    Anxiety    MIRA (acute kidney injury)    Hyperlipidemia, unspecified hyperlipidemia type    History of colon polyps    Gout    Class 1 obesity with body mass index (BMI) of 34.0 to 34.9 in adult    2019 novel coronavirus disease (COVID-19)  12/2020- Hospitalized @ [facility] x5 days- not intubated    COVID-19 vaccine series completed  Moderna- 2nd vaccine 3/2021    S/P knee surgery  bilateral torn meniscus    History of lithotripsy    S/P AVR (aortic valve replacement)  7/1/2016- porcine valve        SOCIAL HISTORY:  Deniest Tobacco  Denies Etoh  Denies Drugs      Allergies    No Known Allergies    Intolerances        MEDICATIONS  (STANDING):  lactated ringers. 1000 milliLiter(s) (75 mL/Hr) IV Continuous <Continuous>    MEDICATIONS  (PRN):  HYDROmorphone  Injectable 0.5 milliGRAM(s) IV Push every 10 minutes PRN Moderate Pain (4 - 6)  ondansetron Injectable 4 milliGRAM(s) IV Push once PRN Nausea and/or Vomiting      FAMILY HISTORY:  Family history of lung cancer (Mother)    Family history of suicide (Father)    Family history of essential hypertension (Sibling)        Vital Signs Last 24 Hrs  T(C): 36.4 (02 Jun 2021 16:00), Max: 36.7 (02 Jun 2021 11:01)  T(F): 97.6 (02 Jun 2021 16:00), Max: 98 (02 Jun 2021 11:01)  HR: 65 (02 Jun 2021 16:30) (64 - 69)  BP: 121/47 (02 Jun 2021 16:30) (116/51 - 150/80)  BP(mean): --  RR: 13 (02 Jun 2021 16:30) (13 - 18)  SpO2: 99% (02 Jun 2021 16:30) (96% - 100%)    PHYSICAL EXAM:    GENERAL: NAD, well-groomed, well-developed  HEAD:  Atraumatic, Normocephalic  EYES: EOMI, PERRLA, conjunctiva and sclera clear  ENMT: No tonsillar erythema, exudates, or enlargement; Moist mucous membranes, Good dentition, No lesions  NECK: Supple, No JVD, Normal thyroid  NERVOUS SYSTEM:  Alert & Oriented X3, Good concentration; Moving all 4 extremities; No gross sensory deficits  CHEST/LUNG: Clear to auscultation bilaterally; No rales, rhonchi, wheezing, or rubs  HEART: Regular rate and rhythm; No murmurs, rubs, or gallops  ABDOMEN: Soft, Nontender, Nondistended; Bowel sounds present  EXTREMITIES:  2+ Peripheral Pulses, No clubbing, cyanosis, or edema  SKIN: No rashes or lesions  INCISION:     LABS:              CAPILLARY BLOOD GLUCOSE          RADIOLOGY & ADDITIONAL STUDIES:    EKG:                  
DOROTHEA VASQUEZ  [id number]    Pt is a 80y year old Male s/p right TKR. pain is 2/10. (+) Voids, tolerating regular diet. Denies chest pain/shortness of breath/nausea/vomitting.     Vital Signs Last 24 Hrs  T(C): 37 (03 Jun 2021 02:57), Max: 37 (03 Jun 2021 02:57)  T(F): 98.6 (03 Jun 2021 02:57), Max: 98.6 (03 Jun 2021 02:57)  HR: 73 (03 Jun 2021 05:28) (64 - 77)  BP: 150/73 (03 Jun 2021 05:28) (116/51 - 155/71)  BP(mean): --  RR: 18 (03 Jun 2021 02:57) (13 - 18)  SpO2: 98% (03 Jun 2021 02:57) (96% - 100%)    I&O's Detail    02 Jun 2021 07:01  -  03 Jun 2021 07:00  --------------------------------------------------------  IN:    Lactated Ringers: 1075 mL    Oral Fluid: 50 mL    Sodium Chloride 0.9% Bolus: 500 mL  Total IN: 1625 mL    OUT:    Voided (mL): 1500 mL  Total OUT: 1500 mL    Total NET: 125 mL                                13.9   10.49 )-----------( 124      ( 02 Jun 2021 20:30 )             42.2     06-02    137  |  103  |  21  ----------------------------<  266<H>  3.9   |  19<L>  |  1.85<H>    Ca    8.9      02 Jun 2021 20:30          PE:   RLE: Dressing changed, incision clean and dry, no erythema or drainage, prineo intact SILT distally, (+2) DP/PT pulses, EHL/FHL/TA intact, Capillary refill < 2 seconds. negative calf tenderness.PAS on,    A: 80y year old Male s/p right TKR POD#1    Plan:   -DVT ppx =  aspirin enteric coated 81 milliGRAM(s) Oral every 12 hours    -PT/OT = OOB  -Pain control   -Medicine to follow   -continue to follow Labs  -continue use of PAS  -Dispo: home planning
Patient is a 80y old  Male who presents with a chief complaint of Right TKR (02 Jun 2021 16:53)      INTERVAL HPI/OVERNIGHT EVENTS:    doing well , no events overnight.  ready to go home. passing gas.     MEDICATIONS  (STANDING):  acetaminophen   Tablet .. 1000 milliGRAM(s) Oral every 8 hours  allopurinol 300 milliGRAM(s) Oral daily  aspirin enteric coated 81 milliGRAM(s) Oral every 12 hours  lactated ringers. 1000 milliLiter(s) (100 mL/Hr) IV Continuous <Continuous>  LORazepam     Tablet 0.5 milliGRAM(s) Oral two times a day  metoprolol tartrate 50 milliGRAM(s) Oral daily  pantoprazole    Tablet 40 milliGRAM(s) Oral before breakfast  polyethylene glycol 3350 17 Gram(s) Oral at bedtime  senna 2 Tablet(s) Oral at bedtime  simvastatin 40 milliGRAM(s) Oral at bedtime    MEDICATIONS  (PRN):  aluminum hydroxide/magnesium hydroxide/simethicone Suspension 30 milliLiter(s) Oral four times a day PRN Indigestion  HYDROmorphone  Injectable 0.5 milliGRAM(s) IV Push every 3 hours PRN breakthrough pain  magnesium hydroxide Suspension 30 milliLiter(s) Oral daily PRN Constipation  ondansetron Injectable 4 milliGRAM(s) IV Push every 6 hours PRN Nausea and/or Vomiting  oxyCODONE    IR 5 milliGRAM(s) Oral every 3 hours PRN Moderate Pain (4 - 6)  oxyCODONE    IR 10 milliGRAM(s) Oral every 3 hours PRN Severe Pain (7 - 10)      Allergies    No Known Allergies    Intolerances      Vital Signs Last 24 Hrs  T(C): 36.4 (03 Jun 2021 08:06), Max: 37 (03 Jun 2021 02:57)  T(F): 97.5 (03 Jun 2021 08:06), Max: 98.6 (03 Jun 2021 02:57)  HR: 63 (03 Jun 2021 08:06) (63 - 77)  BP: 143/87 (03 Jun 2021 08:06) (116/51 - 155/71)  BP(mean): --  RR: 18 (03 Jun 2021 08:06) (13 - 18)  SpO2: 96% (03 Jun 2021 08:06) (96% - 100%)    PHYSICAL EXAM:  GENERAL: NAD  HEAD:  Atraumatic, Normocephalic  EYES: EOMI, PERRLA, conjunctiva and sclera clear  ENMT: Moist mucous membranes, No lesions; No tonsillar erythema, exudates, or enlargement  NECK: Supple, No JVD, Normal thyroid  NERVOUS SYSTEM:  Alert & Oriented X3, Good concentration; All 4 extremities mobile, no gross sensory deficits.   CHEST/LUNG: Clear to auscultation bilaterally; No rales, rhonchi, wheezing, or rubs  HEART: Regular rate and rhythm; No murmurs, rubs, or gallops  ABDOMEN: Soft, Nontender, Nondistended; Bowel sounds present  EXTREMITIES:  2+ Peripheral Pulses, No clubbing, cyanosis, or edema  LYMPH: No lymphadenopathy noted  SKIN: No rashes or lesions    LABS:                        14.4   16.72 )-----------( 116      ( 03 Jun 2021 07:57 )             43.5     03 Jun 2021 07:57    137    |  105    |  20     ----------------------------<  185    4.5     |  23     |  1.52     Ca    9.4        03 Jun 2021 07:57          CAPILLARY BLOOD GLUCOSE          RADIOLOGY & ADDITIONAL TESTS:    Imaging Personally Reviewed:  [ ] YES     Consultant(s) Notes Reviewed:      Care Discussed with Consultants/Other Providers:    Advanced Directives: [ ] DNR  [ ] No feeding tube  [ ] MOLST in chart  [ ] MOLST completed today  [ ] Unknown

## 2021-06-03 NOTE — SBIRT NOTE ADULT - NSSBIRTALCNOACTINTDET_GEN_A_CORE
patient does not feel is alcohol consumption is problematic at this time has 1-2 beers per day and does not plan on drinking at all upon dc

## 2021-06-03 NOTE — OCCUPATIONAL THERAPY INITIAL EVALUATION ADULT - IMPAIRED TRANSFERS: SIT/STAND, REHAB EVAL
pt alert and awake x3, BIBA s/p syncope, denies hitting head, denies blood thinners, denies chest pain. decreased strength

## 2021-06-03 NOTE — DISCHARGE NOTE NURSING/CASE MANAGEMENT/SOCIAL WORK - NSSCNAMETXT_GEN_ALL_CORE
[facility] (formerly [facility])   [address] [place] [address]

## 2021-06-03 NOTE — DISCHARGE NOTE NURSING/CASE MANAGEMENT/SOCIAL WORK - PATIENT PORTAL LINK FT
You can access the FollowMyHealth Patient Portal offered by [facility] by registering at the following website: [email or web address]. By joining [employer]’s FollowMyHealth portal, you will also be able to view your health information using other applications (apps) compatible with our system.

## 2021-06-03 NOTE — DISCHARGE NOTE NURSING/CASE MANAGEMENT/SOCIAL WORK - NSSCTYPOFSERV_GEN_ALL_CORE
Physical Therapist to visit the day after hospital discharge; Registered Nurse to follow. Please contact the home care agency at the above phone number if you have not heard from them by 12 noon on the day after your hospital discharge.

## 2021-06-17 ENCOUNTER — APPOINTMENT (OUTPATIENT)
Dept: ORTHOPEDIC SURGERY | Facility: CLINIC | Age: 81
End: 2021-06-17
Payer: MEDICARE

## 2021-06-17 VITALS — DIASTOLIC BLOOD PRESSURE: 87 MMHG | TEMPERATURE: 97.9 F | HEART RATE: 91 BPM | SYSTOLIC BLOOD PRESSURE: 154 MMHG

## 2021-06-17 PROCEDURE — 73562 X-RAY EXAM OF KNEE 3: CPT | Mod: RT

## 2021-06-17 PROCEDURE — 99024 POSTOP FOLLOW-UP VISIT: CPT

## 2021-08-03 ENCOUNTER — APPOINTMENT (OUTPATIENT)
Dept: ORTHOPEDIC SURGERY | Facility: CLINIC | Age: 81
End: 2021-08-03
Payer: MEDICARE

## 2021-08-03 VITALS — DIASTOLIC BLOOD PRESSURE: 86 MMHG | SYSTOLIC BLOOD PRESSURE: 153 MMHG | HEART RATE: 64 BPM

## 2021-08-03 DIAGNOSIS — Z96.651 PRESENCE OF RIGHT ARTIFICIAL KNEE JOINT: ICD-10-CM

## 2021-08-03 PROCEDURE — 99024 POSTOP FOLLOW-UP VISIT: CPT

## 2021-08-03 PROCEDURE — 73562 X-RAY EXAM OF KNEE 3: CPT | Mod: RT

## 2021-11-19 ENCOUNTER — TRANSCRIPTION ENCOUNTER (OUTPATIENT)
Age: 81
End: 2021-11-19

## 2021-12-13 ENCOUNTER — APPOINTMENT (OUTPATIENT)
Dept: ORTHOPEDIC SURGERY | Facility: CLINIC | Age: 81
End: 2021-12-13
Payer: MEDICARE

## 2021-12-13 ENCOUNTER — NON-APPOINTMENT (OUTPATIENT)
Age: 81
End: 2021-12-13

## 2021-12-13 VITALS
HEIGHT: 66 IN | DIASTOLIC BLOOD PRESSURE: 82 MMHG | BODY MASS INDEX: 33.91 KG/M2 | HEART RATE: 87 BPM | SYSTOLIC BLOOD PRESSURE: 147 MMHG | WEIGHT: 211 LBS

## 2021-12-13 DIAGNOSIS — M17.12 UNILATERAL PRIMARY OSTEOARTHRITIS, LEFT KNEE: ICD-10-CM

## 2021-12-13 PROCEDURE — 99212 OFFICE O/P EST SF 10 MIN: CPT

## 2021-12-13 PROCEDURE — 73562 X-RAY EXAM OF KNEE 3: CPT | Mod: LT

## 2021-12-13 RX ORDER — FUROSEMIDE 20 MG/1
20 TABLET ORAL
Refills: 0 | Status: DISCONTINUED | COMMUNITY
End: 2021-12-13

## 2021-12-13 RX ORDER — MULTIVITAMIN
TABLET ORAL
Refills: 0 | Status: ACTIVE | COMMUNITY

## 2021-12-13 RX ORDER — CALCIUM CARBONATE/VITAMIN D3 600 MG-10
TABLET ORAL
Refills: 0 | Status: ACTIVE | COMMUNITY

## 2021-12-13 RX ORDER — GRAPE SEED OIL 100 %
OIL (ML) MISCELLANEOUS
Refills: 0 | Status: ACTIVE | COMMUNITY

## 2021-12-13 RX ORDER — POTASSIUM CHLORIDE 10 MEQ
10 CAPSULE, EXTENDED RELEASE ORAL
Refills: 0 | Status: DISCONTINUED | COMMUNITY
End: 2021-12-13

## 2021-12-13 RX ORDER — PNV NO.95/FERROUS FUM/FOLIC AC 28MG-0.8MG
TABLET ORAL
Refills: 0 | Status: ACTIVE | COMMUNITY

## 2021-12-13 RX ORDER — PSYLLIUM HUSK 0.4 G
CAPSULE ORAL
Refills: 0 | Status: ACTIVE | COMMUNITY

## 2021-12-21 ENCOUNTER — OUTPATIENT (OUTPATIENT)
Dept: OUTPATIENT SERVICES | Facility: HOSPITAL | Age: 81
LOS: 1 days | End: 2021-12-21
Payer: MEDICARE

## 2021-12-21 VITALS
HEIGHT: 66 IN | WEIGHT: 212.97 LBS | HEART RATE: 64 BPM | RESPIRATION RATE: 16 BRPM | TEMPERATURE: 97 F | SYSTOLIC BLOOD PRESSURE: 147 MMHG | DIASTOLIC BLOOD PRESSURE: 83 MMHG | OXYGEN SATURATION: 98 %

## 2021-12-21 DIAGNOSIS — Z96.651 PRESENCE OF RIGHT ARTIFICIAL KNEE JOINT: Chronic | ICD-10-CM

## 2021-12-21 DIAGNOSIS — Z98.89 OTHER SPECIFIED POSTPROCEDURAL STATES: Chronic | ICD-10-CM

## 2021-12-21 DIAGNOSIS — Z95.2 PRESENCE OF PROSTHETIC HEART VALVE: Chronic | ICD-10-CM

## 2021-12-21 DIAGNOSIS — Z01.818 ENCOUNTER FOR OTHER PREPROCEDURAL EXAMINATION: ICD-10-CM

## 2021-12-21 DIAGNOSIS — M17.12 UNILATERAL PRIMARY OSTEOARTHRITIS, LEFT KNEE: ICD-10-CM

## 2021-12-21 LAB
A1C WITH ESTIMATED AVERAGE GLUCOSE RESULT: 5.8 % — HIGH (ref 4–5.6)
ALBUMIN SERPL ELPH-MCNC: 3.8 G/DL — SIGNIFICANT CHANGE UP (ref 3.3–5)
ALP SERPL-CCNC: 108 U/L — SIGNIFICANT CHANGE UP (ref 30–120)
ALT FLD-CCNC: 33 U/L DA — SIGNIFICANT CHANGE UP (ref 10–60)
ANION GAP SERPL CALC-SCNC: 9 MMOL/L — SIGNIFICANT CHANGE UP (ref 5–17)
APTT BLD: 30.6 SEC — SIGNIFICANT CHANGE UP (ref 27.5–35.5)
AST SERPL-CCNC: 26 U/L — SIGNIFICANT CHANGE UP (ref 10–40)
BILIRUB SERPL-MCNC: 0.5 MG/DL — SIGNIFICANT CHANGE UP (ref 0.2–1.2)
BLD GP AB SCN SERPL QL: SIGNIFICANT CHANGE UP
BUN SERPL-MCNC: 29 MG/DL — HIGH (ref 7–23)
CALCIUM SERPL-MCNC: 9.6 MG/DL — SIGNIFICANT CHANGE UP (ref 8.4–10.5)
CHLORIDE SERPL-SCNC: 106 MMOL/L — SIGNIFICANT CHANGE UP (ref 96–108)
CO2 SERPL-SCNC: 26 MMOL/L — SIGNIFICANT CHANGE UP (ref 22–31)
CREAT SERPL-MCNC: 1.54 MG/DL — HIGH (ref 0.5–1.3)
ESTIMATED AVERAGE GLUCOSE: 120 MG/DL — HIGH (ref 68–114)
GLUCOSE SERPL-MCNC: 108 MG/DL — HIGH (ref 70–99)
HCT VFR BLD CALC: 46.8 % — SIGNIFICANT CHANGE UP (ref 39–50)
HGB BLD-MCNC: 16.2 G/DL — SIGNIFICANT CHANGE UP (ref 13–17)
INR BLD: 1.05 RATIO — SIGNIFICANT CHANGE UP (ref 0.88–1.16)
MCHC RBC-ENTMCNC: 30.5 PG — SIGNIFICANT CHANGE UP (ref 27–34)
MCHC RBC-ENTMCNC: 34.6 GM/DL — SIGNIFICANT CHANGE UP (ref 32–36)
MCV RBC AUTO: 88 FL — SIGNIFICANT CHANGE UP (ref 80–100)
NRBC # BLD: 0 /100 WBCS — SIGNIFICANT CHANGE UP (ref 0–0)
PLATELET # BLD AUTO: 196 K/UL — SIGNIFICANT CHANGE UP (ref 150–400)
POTASSIUM SERPL-MCNC: 3.9 MMOL/L — SIGNIFICANT CHANGE UP (ref 3.5–5.3)
POTASSIUM SERPL-SCNC: 3.9 MMOL/L — SIGNIFICANT CHANGE UP (ref 3.5–5.3)
PROT SERPL-MCNC: 7.2 G/DL — SIGNIFICANT CHANGE UP (ref 6–8.3)
PROTHROM AB SERPL-ACNC: 12.7 SEC — SIGNIFICANT CHANGE UP (ref 10.6–13.6)
RBC # BLD: 5.32 M/UL — SIGNIFICANT CHANGE UP (ref 4.2–5.8)
RBC # FLD: 15.2 % — HIGH (ref 10.3–14.5)
SODIUM SERPL-SCNC: 141 MMOL/L — SIGNIFICANT CHANGE UP (ref 135–145)
WBC # BLD: 7.72 K/UL — SIGNIFICANT CHANGE UP (ref 3.8–10.5)
WBC # FLD AUTO: 7.72 K/UL — SIGNIFICANT CHANGE UP (ref 3.8–10.5)

## 2021-12-21 PROCEDURE — G0463: CPT

## 2021-12-21 RX ORDER — FUROSEMIDE 40 MG
1 TABLET ORAL
Qty: 0 | Refills: 0 | DISCHARGE

## 2021-12-21 RX ORDER — POTASSIUM CHLORIDE 20 MEQ
1 PACKET (EA) ORAL
Qty: 0 | Refills: 0 | DISCHARGE

## 2021-12-21 RX ORDER — SIMVASTATIN 20 MG/1
1 TABLET, FILM COATED ORAL
Qty: 0 | Refills: 0 | DISCHARGE

## 2021-12-21 NOTE — H&P PST ADULT - NSICDXPASTMEDICALHX_GEN_ALL_CORE_FT
PAST MEDICAL HISTORY:  2019 novel coronavirus disease (COVID-19) 12/2020- Hospitalized @ [facility] x5 days- not intubated    Anxiety     Class 1 obesity with body mass index (BMI) of 34.0 to 34.9 in adult     COVID-19 vaccine series completed Moderna- 2nd vaccine 3/2021    Essential hypertension     Gout     History of colon polyps s/p excision    Hyperlipidemia, unspecified hyperlipidemia type     Nonrheumatic aortic valve stenosis s/p AVR-procine valve    Renal calculi passed out

## 2021-12-21 NOTE — H&P PST ADULT - ASSESSMENT
79 y/o male with left knee  Planned surgery. left knee replacemnt  Will obtain medical /cardiac clearance  Pre op instructions provided  EKG to be obtained  covid testing 1/7/  Instructions provided on medications to continue and to take the day morning of surgery

## 2021-12-21 NOTE — H&P PST ADULT - NSICDXFAMILYHX_GEN_ALL_CORE_FT
FAMILY HISTORY:  FH: total knee replacement    Father  Still living? No  Family history of suicide, Age at diagnosis: Age Unknown    Mother  Still living? No  Family history of lung cancer, Age at diagnosis: Age Unknown    Sibling  Still living? Yes, Estimated age: Age Unknown  Family history of essential hypertension, Age at diagnosis: Age Unknown

## 2021-12-21 NOTE — H&P PST ADULT - SKIN/BREAST
Change of Shift Report:      1915:  Bedside and Verbal shift change report given to Sandi Calvo RN (oncoming nurse) by Roxanne Fernandes RN (offgoing nurse). Report included the following information SBAR, Kardex, ED Summary, Intake/Output, MAR, Accordion, Recent Results and Cardiac Rhythm NSR. Shift Summary:    2000:  Patient assessment completed. Patient AOx4, with no complaints of pain. Patient on RA with coarse lung sounds. Patient's heart rate in the 90s-low 100s. Patient's BP's maintaining goal of SBP >100 with albert going at 25 mcg/min. 2120:  Patient's albert titrated down to 20 mcg/min. 0000:  Patient reassessment completed. No new neuro changes. No complaints of pain. Patient resting comfortably. 0025:  Patient's SBP maintaining >100. Albert titrated down to 15 mcg/min.       0212:  Patient's SBP maintaining >100. Albert titrated down to 10 mcg/min.      0400:  Patient reassessment completed. Patient given CHG bath, new gown, and new linens. Laurie care completed. Patient given new pure wick. Patient AOx4, no complaints of pain overnight. Patient's heart rates in the 70s-80s. Patient's SBPs maintaining >100 with albert currently going at 10 mcg/min. Patient had total urine output of 450 mL overnight. End of Shift Report:    0715: Bedside and Verbal shift change report given to Roxanne Fernandes RN (oncoming nurse) by Radha Pantoja RN (offgoing nurse). Report included the following information SBAR, Kardex, ED Summary, Intake/Output, MAR, Accordion, Recent Results and Cardiac Rhythm NSR. negative

## 2021-12-22 LAB
MRSA PCR RESULT.: SIGNIFICANT CHANGE UP
S AUREUS DNA NOSE QL NAA+PROBE: SIGNIFICANT CHANGE UP

## 2022-01-05 PROBLEM — Z86.010 PERSONAL HISTORY OF COLON POLYPS: Chronic | Status: ACTIVE | Noted: 2017-08-25

## 2022-01-05 PROBLEM — Z86.010 PERSONAL HISTORY OF COLONIC POLYPS: Chronic | Status: ACTIVE | Noted: 2017-08-25

## 2022-01-07 ENCOUNTER — OUTPATIENT (OUTPATIENT)
Dept: OUTPATIENT SERVICES | Facility: HOSPITAL | Age: 82
LOS: 1 days | End: 2022-01-07

## 2022-01-07 DIAGNOSIS — Z98.89 OTHER SPECIFIED POSTPROCEDURAL STATES: Chronic | ICD-10-CM

## 2022-01-07 DIAGNOSIS — Z95.2 PRESENCE OF PROSTHETIC HEART VALVE: Chronic | ICD-10-CM

## 2022-01-07 DIAGNOSIS — Z20.828 CONTACT WITH AND (SUSPECTED) EXPOSURE TO OTHER VIRAL COMMUNICABLE DISEASES: ICD-10-CM

## 2022-01-07 DIAGNOSIS — Z96.651 PRESENCE OF RIGHT ARTIFICIAL KNEE JOINT: Chronic | ICD-10-CM

## 2022-01-07 LAB — SARS-COV-2 RNA SPEC QL NAA+PROBE: SIGNIFICANT CHANGE UP

## 2022-01-10 ENCOUNTER — APPOINTMENT (OUTPATIENT)
Dept: ORTHOPEDIC SURGERY | Facility: HOSPITAL | Age: 82
End: 2022-01-10

## 2022-01-10 ENCOUNTER — TRANSCRIPTION ENCOUNTER (OUTPATIENT)
Age: 82
End: 2022-01-10

## 2022-01-10 ENCOUNTER — INPATIENT (INPATIENT)
Facility: HOSPITAL | Age: 82
LOS: 0 days | Discharge: HOME | DRG: 470 | End: 2022-01-11
Attending: ORTHOPAEDIC SURGERY | Admitting: ORTHOPAEDIC SURGERY
Payer: MEDICARE

## 2022-01-10 ENCOUNTER — RESULT REVIEW (OUTPATIENT)
Age: 82
End: 2022-01-10

## 2022-01-10 VITALS
WEIGHT: 208.78 LBS | HEIGHT: 67 IN | OXYGEN SATURATION: 96 % | HEART RATE: 79 BPM | DIASTOLIC BLOOD PRESSURE: 81 MMHG | RESPIRATION RATE: 15 BRPM | TEMPERATURE: 98 F | SYSTOLIC BLOOD PRESSURE: 178 MMHG

## 2022-01-10 DIAGNOSIS — Z96.651 PRESENCE OF RIGHT ARTIFICIAL KNEE JOINT: Chronic | ICD-10-CM

## 2022-01-10 DIAGNOSIS — Z98.89 OTHER SPECIFIED POSTPROCEDURAL STATES: Chronic | ICD-10-CM

## 2022-01-10 DIAGNOSIS — M17.12 UNILATERAL PRIMARY OSTEOARTHRITIS, LEFT KNEE: ICD-10-CM

## 2022-01-10 DIAGNOSIS — Z95.2 PRESENCE OF PROSTHETIC HEART VALVE: Chronic | ICD-10-CM

## 2022-01-10 LAB
ANION GAP SERPL CALC-SCNC: 10 MMOL/L — SIGNIFICANT CHANGE UP (ref 5–17)
BUN SERPL-MCNC: 21 MG/DL — SIGNIFICANT CHANGE UP (ref 7–23)
CALCIUM SERPL-MCNC: 8.6 MG/DL — SIGNIFICANT CHANGE UP (ref 8.4–10.5)
CHLORIDE SERPL-SCNC: 104 MMOL/L — SIGNIFICANT CHANGE UP (ref 96–108)
CO2 SERPL-SCNC: 24 MMOL/L — SIGNIFICANT CHANGE UP (ref 22–31)
CREAT SERPL-MCNC: 1.58 MG/DL — HIGH (ref 0.5–1.3)
GLUCOSE SERPL-MCNC: 206 MG/DL — HIGH (ref 70–99)
HCT VFR BLD CALC: 41.7 % — SIGNIFICANT CHANGE UP (ref 39–50)
HGB BLD-MCNC: 14.1 G/DL — SIGNIFICANT CHANGE UP (ref 13–17)
POTASSIUM SERPL-MCNC: 3.7 MMOL/L — SIGNIFICANT CHANGE UP (ref 3.5–5.3)
POTASSIUM SERPL-SCNC: 3.7 MMOL/L — SIGNIFICANT CHANGE UP (ref 3.5–5.3)
SODIUM SERPL-SCNC: 138 MMOL/L — SIGNIFICANT CHANGE UP (ref 135–145)

## 2022-01-10 PROCEDURE — 73560 X-RAY EXAM OF KNEE 1 OR 2: CPT | Mod: 26,LT

## 2022-01-10 PROCEDURE — 88305 TISSUE EXAM BY PATHOLOGIST: CPT | Mod: 26

## 2022-01-10 PROCEDURE — 88311 DECALCIFY TISSUE: CPT | Mod: 26

## 2022-01-10 PROCEDURE — 27447 TOTAL KNEE ARTHROPLASTY: CPT | Mod: LT

## 2022-01-10 DEVICE — CEMENT BONE COBALT G-HV: Type: IMPLANTABLE DEVICE | Site: LEFT | Status: FUNCTIONAL

## 2022-01-10 DEVICE — BONE WAX 2.5GM: Type: IMPLANTABLE DEVICE | Site: LEFT | Status: FUNCTIONAL

## 2022-01-10 DEVICE — COMP PATELLA TRI-PEG E-PLUS POLY 9X35MM: Type: IMPLANTABLE DEVICE | Site: LEFT | Status: FUNCTIONAL

## 2022-01-10 DEVICE — PIN THREADED HEADED STRL: Type: IMPLANTABLE DEVICE | Site: LEFT | Status: FUNCTIONAL

## 2022-01-10 DEVICE — IMPLANTABLE DEVICE: Type: IMPLANTABLE DEVICE | Site: LEFT | Status: FUNCTIONAL

## 2022-01-10 DEVICE — INSERT TIB EMPOWR VVC SZ 7 12MM: Type: IMPLANTABLE DEVICE | Site: LEFT | Status: FUNCTIONAL

## 2022-01-10 DEVICE — COMP FEM NON POROUS SZ 6 LT: Type: IMPLANTABLE DEVICE | Site: LEFT | Status: FUNCTIONAL

## 2022-01-10 DEVICE — INSERT TIB NONPOROUS UNIV SZ 7 LT: Type: IMPLANTABLE DEVICE | Site: LEFT | Status: FUNCTIONAL

## 2022-01-10 RX ORDER — CEFAZOLIN SODIUM 1 G
2000 VIAL (EA) INJECTION ONCE
Refills: 0 | Status: COMPLETED | OUTPATIENT
Start: 2022-01-10 | End: 2022-01-10

## 2022-01-10 RX ORDER — AMLODIPINE BESYLATE 2.5 MG/1
1 TABLET ORAL
Qty: 0 | Refills: 0 | DISCHARGE

## 2022-01-10 RX ORDER — SENNA PLUS 8.6 MG/1
2 TABLET ORAL AT BEDTIME
Refills: 0 | Status: DISCONTINUED | OUTPATIENT
Start: 2022-01-10 | End: 2022-01-11

## 2022-01-10 RX ORDER — PREGABALIN 225 MG/1
1 CAPSULE ORAL
Qty: 0 | Refills: 0 | DISCHARGE

## 2022-01-10 RX ORDER — METOPROLOL TARTRATE 50 MG
1 TABLET ORAL
Qty: 0 | Refills: 0 | DISCHARGE

## 2022-01-10 RX ORDER — OXYCODONE HYDROCHLORIDE 5 MG/1
10 TABLET ORAL
Refills: 0 | Status: DISCONTINUED | OUTPATIENT
Start: 2022-01-10 | End: 2022-01-11

## 2022-01-10 RX ORDER — ACETAMINOPHEN 500 MG
1000 TABLET ORAL EVERY 8 HOURS
Refills: 0 | Status: DISCONTINUED | OUTPATIENT
Start: 2022-01-11 | End: 2022-01-11

## 2022-01-10 RX ORDER — CHLORHEXIDINE GLUCONATE 213 G/1000ML
1 SOLUTION TOPICAL ONCE
Refills: 0 | Status: COMPLETED | OUTPATIENT
Start: 2022-01-10 | End: 2022-01-10

## 2022-01-10 RX ORDER — SODIUM CHLORIDE 9 MG/ML
500 INJECTION INTRAMUSCULAR; INTRAVENOUS; SUBCUTANEOUS ONCE
Refills: 0 | Status: COMPLETED | OUTPATIENT
Start: 2022-01-10 | End: 2022-01-10

## 2022-01-10 RX ORDER — ASPIRIN/CALCIUM CARB/MAGNESIUM 324 MG
1 TABLET ORAL
Qty: 83 | Refills: 0
Start: 2022-01-10 | End: 2022-02-20

## 2022-01-10 RX ORDER — SIMVASTATIN 20 MG/1
1 TABLET, FILM COATED ORAL
Qty: 0 | Refills: 0 | DISCHARGE

## 2022-01-10 RX ORDER — OXYCODONE HYDROCHLORIDE 5 MG/1
5 TABLET ORAL
Refills: 0 | Status: DISCONTINUED | OUTPATIENT
Start: 2022-01-10 | End: 2022-01-11

## 2022-01-10 RX ORDER — OMEPRAZOLE 10 MG/1
1 CAPSULE, DELAYED RELEASE ORAL
Qty: 30 | Refills: 0
Start: 2022-01-10 | End: 2022-02-08

## 2022-01-10 RX ORDER — SIMVASTATIN 20 MG/1
40 TABLET, FILM COATED ORAL AT BEDTIME
Refills: 0 | Status: DISCONTINUED | OUTPATIENT
Start: 2022-01-10 | End: 2022-01-11

## 2022-01-10 RX ORDER — TRANEXAMIC ACID 100 MG/ML
1000 INJECTION, SOLUTION INTRAVENOUS ONCE
Refills: 0 | Status: COMPLETED | OUTPATIENT
Start: 2022-01-10 | End: 2022-01-10

## 2022-01-10 RX ORDER — SODIUM CHLORIDE 9 MG/ML
1000 INJECTION, SOLUTION INTRAVENOUS
Refills: 0 | Status: DISCONTINUED | OUTPATIENT
Start: 2022-01-10 | End: 2022-01-11

## 2022-01-10 RX ORDER — APREPITANT 80 MG/1
40 CAPSULE ORAL ONCE
Refills: 0 | Status: COMPLETED | OUTPATIENT
Start: 2022-01-10 | End: 2022-01-10

## 2022-01-10 RX ORDER — MAGNESIUM HYDROXIDE 400 MG/1
30 TABLET, CHEWABLE ORAL DAILY
Refills: 0 | Status: DISCONTINUED | OUTPATIENT
Start: 2022-01-10 | End: 2022-01-11

## 2022-01-10 RX ORDER — ALLOPURINOL 300 MG
300 TABLET ORAL DAILY
Refills: 0 | Status: DISCONTINUED | OUTPATIENT
Start: 2022-01-10 | End: 2022-01-11

## 2022-01-10 RX ORDER — ASPIRIN/CALCIUM CARB/MAGNESIUM 324 MG
81 TABLET ORAL EVERY 12 HOURS
Refills: 0 | Status: DISCONTINUED | OUTPATIENT
Start: 2022-01-11 | End: 2022-01-11

## 2022-01-10 RX ORDER — POLYETHYLENE GLYCOL 3350 17 G/17G
17 POWDER, FOR SOLUTION ORAL AT BEDTIME
Refills: 0 | Status: DISCONTINUED | OUTPATIENT
Start: 2022-01-10 | End: 2022-01-11

## 2022-01-10 RX ORDER — ACETAMINOPHEN 500 MG
1000 TABLET ORAL ONCE
Refills: 0 | Status: COMPLETED | OUTPATIENT
Start: 2022-01-10 | End: 2022-01-10

## 2022-01-10 RX ORDER — METOPROLOL TARTRATE 50 MG
50 TABLET ORAL DAILY
Refills: 0 | Status: DISCONTINUED | OUTPATIENT
Start: 2022-01-10 | End: 2022-01-11

## 2022-01-10 RX ORDER — DEXAMETHASONE 0.5 MG/5ML
8 ELIXIR ORAL ONCE
Refills: 0 | Status: COMPLETED | OUTPATIENT
Start: 2022-01-11 | End: 2022-01-11

## 2022-01-10 RX ORDER — CEFAZOLIN SODIUM 1 G
2000 VIAL (EA) INJECTION EVERY 8 HOURS
Refills: 0 | Status: COMPLETED | OUTPATIENT
Start: 2022-01-10 | End: 2022-01-11

## 2022-01-10 RX ORDER — ACETAMINOPHEN 500 MG
0 TABLET ORAL
Qty: 0 | Refills: 0 | DISCHARGE
Start: 2022-01-10

## 2022-01-10 RX ORDER — PANTOPRAZOLE SODIUM 20 MG/1
40 TABLET, DELAYED RELEASE ORAL
Refills: 0 | Status: DISCONTINUED | OUTPATIENT
Start: 2022-01-10 | End: 2022-01-11

## 2022-01-10 RX ORDER — ONDANSETRON 8 MG/1
4 TABLET, FILM COATED ORAL EVERY 6 HOURS
Refills: 0 | Status: DISCONTINUED | OUTPATIENT
Start: 2022-01-10 | End: 2022-01-11

## 2022-01-10 RX ORDER — AMLODIPINE BESYLATE 2.5 MG/1
5 TABLET ORAL DAILY
Refills: 0 | Status: DISCONTINUED | OUTPATIENT
Start: 2022-01-12 | End: 2022-01-11

## 2022-01-10 RX ORDER — CHOLECALCIFEROL (VITAMIN D3) 125 MCG
1 CAPSULE ORAL
Qty: 0 | Refills: 0 | DISCHARGE

## 2022-01-10 RX ORDER — HYDROMORPHONE HYDROCHLORIDE 2 MG/ML
0.5 INJECTION INTRAMUSCULAR; INTRAVENOUS; SUBCUTANEOUS
Refills: 0 | Status: DISCONTINUED | OUTPATIENT
Start: 2022-01-10 | End: 2022-01-11

## 2022-01-10 RX ORDER — ALLOPURINOL 300 MG
1 TABLET ORAL
Qty: 0 | Refills: 0 | DISCHARGE

## 2022-01-10 RX ADMIN — APREPITANT 40 MILLIGRAM(S): 80 CAPSULE ORAL at 11:26

## 2022-01-10 RX ADMIN — CHLORHEXIDINE GLUCONATE 1 APPLICATION(S): 213 SOLUTION TOPICAL at 11:26

## 2022-01-10 RX ADMIN — SODIUM CHLORIDE 100 MILLILITER(S): 9 INJECTION, SOLUTION INTRAVENOUS at 21:45

## 2022-01-10 RX ADMIN — SODIUM CHLORIDE 500 MILLILITER(S): 9 INJECTION INTRAMUSCULAR; INTRAVENOUS; SUBCUTANEOUS at 16:24

## 2022-01-10 RX ADMIN — Medication 1000 MILLIGRAM(S): at 21:49

## 2022-01-10 RX ADMIN — SODIUM CHLORIDE 100 MILLILITER(S): 9 INJECTION, SOLUTION INTRAVENOUS at 19:00

## 2022-01-10 RX ADMIN — Medication 100 MILLIGRAM(S): at 21:10

## 2022-01-10 RX ADMIN — SODIUM CHLORIDE 100 MILLILITER(S): 9 INJECTION, SOLUTION INTRAVENOUS at 16:24

## 2022-01-10 RX ADMIN — Medication 400 MILLIGRAM(S): at 21:46

## 2022-01-10 RX ADMIN — SIMVASTATIN 40 MILLIGRAM(S): 20 TABLET, FILM COATED ORAL at 21:45

## 2022-01-10 NOTE — DISCHARGE NOTE PROVIDER - NSDCMRMEDTOKEN_GEN_ALL_CORE_FT
allopurinol 300 mg oral tablet: 1 tab(s) orally once a day  amLODIPine 5 mg oral tablet: 1 tab(s) orally once a day (in the morning)  aspirin 81 mg oral delayed release tablet: 1 tab orally every 12 hours.  LORazepam 0.5 mg oral tablet: 1 tab(s) orally 2 times a day, As Needed  Metoprolol Tartrate 50 mg oral tablet: 1 tab(s) orally once a day  multivitamin: 1 tab(s) orally once a day  simvastatin 40 mg oral tablet: 1 tab(s) orally once a day (at bedtime)  Vitamin B-12 1000 mcg oral tablet: 1 tab(s) orally once a day  Vitamin D3 5000 intl units (125 mcg) oral capsule: 1 cap(s) orally once a day   acetaminophen:   allopurinol 300 mg oral tablet: 1 tab(s) orally once a day  amLODIPine 5 mg oral tablet: 1 tab(s) orally once a day (in the morning)  aspirin 81 mg oral delayed release tablet: 1 tab orally every 12 hours.  LORazepam 0.5 mg oral tablet: 1 tab(s) orally 2 times a day, As Needed  Metoprolol Tartrate 50 mg oral tablet: 1 tab(s) orally once a day  multivitamin: 1 tab(s) orally once a day  omeprazole 20 mg oral delayed release capsule: 1 cap(s) orally once a day   simvastatin 40 mg oral tablet: 1 tab(s) orally once a day (at bedtime)  Vitamin B-12 1000 mcg oral tablet: 1 tab(s) orally once a day  Vitamin D3 5000 intl units (125 mcg) oral capsule: 1 cap(s) orally once a day   acetaminophen 500 mg oral tablet: 2 tab(s) orally every 8 hours  allopurinol 300 mg oral tablet: 1 tab(s) orally once a day  amLODIPine 5 mg oral tablet: 1 tab(s) orally once a day (in the morning)  aspirin 81 mg oral delayed release tablet: 1 tab orally every 12 hours.  LORazepam 0.5 mg oral tablet: 1 tab(s) orally 2 times a day, As Needed  Metoprolol Tartrate 50 mg oral tablet: 1 tab(s) orally once a day  multivitamin: 1 tab(s) orally once a day  omeprazole 20 mg oral delayed release capsule: 1 cap(s) orally once a day   simvastatin 40 mg oral tablet: 1 tab(s) orally once a day (at bedtime)  traMADol 50 mg oral tablet: 1-2  tab(s) orally every 4-6  hours   [facility] ref#[id number]   MDD:6  Vitamin B-12 1000 mcg oral tablet: 1 tab(s) orally once a day  Vitamin D3 5000 intl units (125 mcg) oral capsule: 1 cap(s) orally once a day

## 2022-01-10 NOTE — DISCHARGE NOTE PROVIDER - PROVIDER TOKENS
PROVIDER:[TOKEN:[[id number]]] PROVIDER:[TOKEN:[[id number]],SCHEDULEDAPPT:[01/24/2022],SCHEDULEDAPPTTIME:[09:20 AM],ESTABLISHEDPATIENT:[T]]

## 2022-01-10 NOTE — PHYSICAL THERAPY INITIAL EVALUATION ADULT - GAIT DEVIATIONS NOTED, PT EVAL
decreased richie/increased time in double stance/decreased velocity of limb motion/decreased stride length/decreased weight-shifting ability

## 2022-01-10 NOTE — DISCHARGE NOTE PROVIDER - CARE PROVIDER_API CALL
Brown Kruger)  Orthopedics  [address]  [place] [address]  Phone: [phone]  Fax: [phone]  Follow Up Time:    Brown Kruger)  Orthopedics  [address]  [place] [address]  Phone: [phone]  Fax: [phone]  Established Patient  Scheduled Appointment: 01/24/2022 09:20 AM

## 2022-01-10 NOTE — PHYSICAL THERAPY INITIAL EVALUATION ADULT - ADDITIONAL COMMENTS
Pt lives with spouse in a house w/ 1 step to enter, 3+5 steps with rail inside.  Pt has rolling walker and straight cane

## 2022-01-10 NOTE — DISCHARGE NOTE PROVIDER - HOSPITAL COURSE
The patient is a 81 y.o. male with severe osteoarthritis of the left knee.    After admission on 1/10 and receiving pre-operative parenteral prophylactic antibiotics, the patient  underwent an  uncomplicated left total knee replacement by orthopedic surgeon Dr. Kruger.    A medical consultation from the Hospitalist service was obtained for post-operative medical co-management. Typical Physical & occupational therapy modalities post surgery were performed including ambulation training, range of motion, ADL's, abd transfers.  The patient had a clean appearing surgical incision with no sign of surgical site infections and had a stable neuro / vascular exam of the operated extremity.  After progression of mobility guided by the PT/ OT staff,  the patient was felt to benefit from further rehabilitative care for restoration to level of function. This was felt to best be accomplished at home.  Discharge and Orthopedic Care instructions were delineated in the Discharge Plan and reviewed with the patient. All medications were delineated in the medication reconciliation tool and key points were reviewed with the patient. They were deemed stable from an Orthopedic & medical standpoint for discharge today.

## 2022-01-10 NOTE — BRIEF OPERATIVE NOTE - NSICDXBRIEFPREOP_GEN_ALL_CORE_FT
PRE-OP DIAGNOSIS:  Primary localized osteoarthritis of left knee 10-Fred-2022 15:45:39  Florentino Bliss

## 2022-01-10 NOTE — BRIEF OPERATIVE NOTE - NSICDXBRIEFPOSTOP_GEN_ALL_CORE_FT
POST-OP DIAGNOSIS:  Primary localized osteoarthritis of left knee 10-Fred-2022 15:45:48  Florentino Bliss

## 2022-01-10 NOTE — PATIENT PROFILE ADULT - FALL HARM RISK - UNIVERSAL INTERVENTIONS
Bed in lowest position, wheels locked, appropriate side rails in place/Call bell, personal items and telephone in reach/Instruct patient to call for assistance before getting out of bed or chair/Non-slip footwear when patient is out of bed/Gilbert to call system/Physically safe environment - no spills, clutter or unnecessary equipment/Purposeful Proactive Rounding/Room/bathroom lighting operational, light cord in reach

## 2022-01-10 NOTE — DISCHARGE NOTE PROVIDER - NSDCQMAMI_CARD_ALL_CORE
History of Present Illness





- History of Present Illness


History of Present Illness: 


54 year old male with PMH of right leg skin/skin structure infection with 

chronic leg ulcers, growing MRSA and sensitive Klebsiella in 2016, Bilateral 

lower extremity skin and skin structure infection (Enterobacter, Klebsiella 

Oxytoca and Group B Strep, as well as MRSA) which was treated 9/2015; MRSA and 

Pseudomonas cellulitis of left leg in Dec 2015, May and June 2016, and MRSA 

cellulitis in Sept. 2016, chronic lymphedema of the lower extremities, Morbid 

obesity with BMI 53, HTN, DM, history of Strep bacteremia, history of hernia 

surgery, Learning disability wasa brought in to [facility] because 

of failed outpatient therapy for the right leg, which has been weeping 

associated with the ulcers on the legs. There has been no note of fever, no 

vomiting, no diarrhea, no loss of consciousness, no convulsions. Infectious 

Diseases consult is requested to further evaluate and manage.





Review of Systems





- Review of Systems


Systems not reviewed;Unavailable: Other (learning disability)





Past Patient History





- Infectious Disease


Hx of Infectious Diseases: None





- Tetanus Immunizations


Tetanus Immunization: Unknown





- Past Medical History & Family History


Past Medical History?: Yes





- Past Social History


Smoking Status: Former Smoker





- CARDIAC


Hx Cardiac Disorders: Yes


Hx Congestive Heart Failure: Yes


Hx Hypertension: Yes





- PULMONARY


Hx Chronic Obstructive Pulmonary Disease (COPD): No





- NEUROLOGICAL


Other/Comment: Epilepsy





- HEENT


Hx HEENT Problems: Yes


Other/Comment: wears glasses





- RENAL


Hx Renal Failure: No





- ENDOCRINE/METABOLIC


Hx Diabetes Mellitus Type 2: Yes





- HEMATOLOGICAL/ONCOLOGICAL


Hx Blood Disorders: No


Hx AIDS: No


Hx Anemia: No


Hx Cancer: No


Hx Chemotherapy: No


Hx Cirrhosis: No


Hx Hepatitis A: No


Hx Hepatitis B: No


Hx Hepatitis C: No


Hx Metastesis: No


Hx Shingles: No


Hx Unexplained Bleeding: No


Other/Comment: blood infection





- INTEGUMENTARY


Hx Dermatological Problems: Yes


Hx Basil Cell: No


Hx Eczema: No


Hx Melanoma: No


Hx Psoriasis: No


Hx Squamous Cell: No


Other/Comment: multiple wounds on the rt leg, cellulitis in b/l extreminity. 

Left thigh cellulitis





- MUSCULOSKELETAL/RHEUMATOLOGICAL


Hx Falls: Yes





- GASTROINTESTINAL


Hx Gastrointestinal Disorders: No


Hx Colostomy: No


Hx Crohn's Disease: No


Hx Diverticulitis: No


Hx Gall Bladder Disease: No


Hx Gastroesophageal Reflux: No


Hx Ileostomy: No


Hx Liver Failure: No


Hx Pancreatitis: No


HX Swallowing Problems: No





- GENITOURINARY/GYNECOLOGICAL


Hx Genitourinary Disorders: No


Hx Hematuria: No


Hx Incontinence: No


Hx Prostate Problems: No


Hx Sexually Transmitted Disorders: No


Hx Urinary Tract Infection: No





- PSYCHIATRIC


Hx Psychophysiologic Disorder: Yes


Hx Substance Use: No


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





- SURGICAL HISTORY


Hx Amputation: No


Hx Appendectomy: No


Hx Cholecystectomy: No


Hx Gastric Bypass Surgery: No


Hx Hysterectomy: No


Hx Joint Replacement: No


Hx Kidney Transplant: No


Hx Liver Transplant: No


Hx Mastectomy: No


Hx Musculoskeletal Surgery: No


Hx Open Heart Surgery: No


Hx Orthopedic Surgery: No


Hx Splenectomy: No


Hx Valve Replacement: No





- ANESTHESIA


Hx Anesthesia: Yes


Hx Anesthesia Reactions: No


Hx Malignant Hyperthermia: No





Meds


Allergies/Adverse Reactions: 


 Allergies











Allergy/AdvReac Type Severity Reaction Status Date / Time


 


No Known Allergies Allergy   Verified 07/05/17 13:00














- Medications


Medications: 


 Current Medications





Atorvastatin Calcium (Lipitor)  10 mg PO DIN CYNDEE


Diltiazem HCl (Cardizem Cd)  120 mg PO DAILY CYNDEE


Furosemide (Lasix)  20 mg PO DAILY CYNDEE


Gabapentin (Neurontin)  400 mg PO TID CYNDEE


   PRN Reason: Protocol


Glipizide (Glucotrol)  10 mg PO ACBD [facility]


Cefepime HCl (Maxipime 1gm)  1 gm in 100 mls @ 100 mls/hr IVPB Q8 CYNDEE


   PRN Reason: Protocol


Vancomycin HCl (Vancomycin 1gm)  250 mls @ 167 mls/hr IVPB Q12H CYNDEE


   PRN Reason: Protocol


Insulin Human Regular (Humulin R High)  0 units SC ACHS CNYDEE


   PRN Reason: Protocol


Metformin HCl (Glucophage)  1,000 mg PO ACBD [facility]


Potassium Chloride (K-Dur 20 Meq Er Tab)  20 meq PO DAILY CYNDEE


Ramipril (Altace)  10 mg PO DAILY CYNDEE


Warfarin Sodium (Coumadin)  10 mg PO DAILY CYNDEE


   PRN Reason: Protocol


Warfarin Sodium (Coumadin)  2 mg PO DAILY CYNDEE


   PRN Reason: Protocol











Physical Exam





- Constitutional


Appears: Non-toxic, No Acute Distress





- Head Exam


Head Exam: NORMAL INSPECTION





- ENT Exam


ENT Exam: Mucous Membranes Moist





- Neck Exam


Neck exam: Negative for: Lymphadenopathy, Meningismus





- Respiratory Exam


Respiratory Exam: Decreased Breath Sounds





- Cardiovascular Exam


Cardiovascular Exam: +S1, +S2





- GI/Abdominal Exam


GI & Abdominal Exam: Soft.  absent: Tenderness





- Extremities Exam


Additional comments: 





right leg with dry dressings in place





Results





- Vital Signs


Recent Vital Signs: 


 Last Vital Signs











Temp  97.9 F   07/05/17 11:02


 


Pulse  79   07/05/17 12:06


 


Resp  18   07/05/17 12:06


 


BP  144/79   07/05/17 12:06


 


Pulse Ox  97   07/05/17 12:06














- Labs


Result Diagrams: 


 07/05/17 11:15





 07/05/17 11:15


Labs: 


 Laboratory Results - last 24 hr











  07/05/17





  16:13


 


POC Glucose (mg/dL)  325 H














Assessment & Plan





- Assessment and Plan (Free Text)


Plan: 





Assessment


Probable right leg skin and skin structure infection associated with chronic 

leg ulcers


history of sepsis secondary to right leg skin/skin structure infection with 

chronic leg ulcers, growing MRSA and sensitive Klebsiella 


history of MRSA cellulitis Sept. 2016


history of Bilateral lower extremity skin and skin structure infection (

Enterobacter, Klebsiella Oxytoca and Group B Strep, as well as MRSA) which was 

treated 9/2015; MRSA and Pseudomonas cellulitis of left leg in Dec 2015, May 

and June 2016


chronic lymphedema of the lower extremities


Morbid obesity with BMI 55


HTN


DM


history of Strep bacteremia


history of hernia surgery


Learning disability





Plan


started patient on Vancomycin and cefepime pending blood and wound cx; follow 

up Podiatry evaluation and plans


will monitor clinically No

## 2022-01-10 NOTE — DISCHARGE NOTE PROVIDER - NSDCCPCAREPLAN_GEN_ALL_CORE_FT
PRINCIPAL DISCHARGE DIAGNOSIS  Diagnosis: Primary osteoarthritis of left knee  Assessment and Plan of Treatment: Activity   • Weight Bearing as tolerated.   • Take short, frequent walks increasing the distance that you walk each day (as tolerated).   • Change your position every hour to decrease pain and stiffness.   • Continue the exercises taught to you by your physical therapist.   • No driving until cleared by the doctor.   • No tub baths, hot tubs, or swimming pools until instructed by your doctor.   • Do not squat down on the floor.   • Do not kneel or twist your knee.   • Avoid activities that place stress on your knee.  Wound Care  • Keep your incision clean and dry.  • You may use an icee pack for 20 minutes on and 20 minutes off to decrease pain and swelling.  Medication  • Ecotrin (Aspirin) 81mg 2 times a day  x 6 wks.  • Follow up with your Primary Care Physician within 2 weeks from arrival home for examination and blood work.       PRINCIPAL DISCHARGE DIAGNOSIS  Diagnosis: Primary osteoarthritis of left knee  Assessment and Plan of Treatment: Activity   • Weight Bearing as tolerated.   • Take short, frequent walks increasing the distance that you walk each day (as tolerated).   • Change your position every hour to decrease pain and stiffness.   • Continue the exercises taught to you by your physical therapist.   • No driving until cleared by the doctor.   • No tub baths, hot tubs, or swimming pools until instructed by your doctor.   • Do not squat down on the floor.   • Do not kneel or twist your knee.   • Avoid activities that place stress on your knee.  Wound Care  • Keep your incision clean and dry.    Do not remove the Prineo (tape) dressing until seen in the office in 2 weeks.  • You may use an icee pack for 20 minutes on and 20 minutes off to decrease pain and swelling.  Medication  • Ecotrin (Aspirin) 81mg 2 times a day  x 6 wks.  • Follow up with your Primary Care Physician within 2 weeks from arrival home for examination and blood work.       PRINCIPAL DISCHARGE DIAGNOSIS  Diagnosis: Primary osteoarthritis of left knee  Assessment and Plan of Treatment: Activity   • Weight Bearing as tolerated.   • Take short, frequent walks increasing the distance that you walk each day (as tolerated).   • Change your position every hour to decrease pain and stiffness.   • Continue the exercises taught to you by your physical therapist.   • No driving until cleared by the doctor.   • No tub baths, hot tubs, or swimming pools until instructed by your doctor.   • Do not squat down on the floor.   • Do not kneel or twist your knee.   • Avoid activities that place stress on your knee.  Wound Care  • Keep your incision clean and dry.    Do not remove the Prineo (tape) dressing until seen in the office in 2 weeks.  • You may use the cryo cuff or icee pack. 20 minutes off each hour to decrease pain and swelling.  (please follow given instructions)  Medication  • Ecotrin (Aspirin) 81mg 2 times a day  x 6 wks.  • Follow up with your Primary Care Physician within 2 weeks from arrival home for examination and blood work.

## 2022-01-10 NOTE — PHYSICAL THERAPY INITIAL EVALUATION ADULT - GENERAL OBSERVATIONS, REHAB EVAL
pt received semisupine in bed  in PACU, +IV, +SCDs, +tele pt received semisupine in bed  in PACU, +IV, +SCDs, +tele, +cryocuff

## 2022-01-11 ENCOUNTER — TRANSCRIPTION ENCOUNTER (OUTPATIENT)
Age: 82
End: 2022-01-11

## 2022-01-11 VITALS
TEMPERATURE: 98 F | HEART RATE: 74 BPM | OXYGEN SATURATION: 96 % | DIASTOLIC BLOOD PRESSURE: 66 MMHG | SYSTOLIC BLOOD PRESSURE: 161 MMHG | RESPIRATION RATE: 16 BRPM

## 2022-01-11 LAB
ANION GAP SERPL CALC-SCNC: 4 MMOL/L — LOW (ref 5–17)
BUN SERPL-MCNC: 17 MG/DL — SIGNIFICANT CHANGE UP (ref 7–23)
CALCIUM SERPL-MCNC: 8.7 MG/DL — SIGNIFICANT CHANGE UP (ref 8.4–10.5)
CHLORIDE SERPL-SCNC: 105 MMOL/L — SIGNIFICANT CHANGE UP (ref 96–108)
CO2 SERPL-SCNC: 31 MMOL/L — SIGNIFICANT CHANGE UP (ref 22–31)
CREAT SERPL-MCNC: 1.38 MG/DL — HIGH (ref 0.5–1.3)
GLUCOSE SERPL-MCNC: 100 MG/DL — HIGH (ref 70–99)
HCT VFR BLD CALC: 44 % — SIGNIFICANT CHANGE UP (ref 39–50)
HGB BLD-MCNC: 14.8 G/DL — SIGNIFICANT CHANGE UP (ref 13–17)
MCHC RBC-ENTMCNC: 29.8 PG — SIGNIFICANT CHANGE UP (ref 27–34)
MCHC RBC-ENTMCNC: 33.6 GM/DL — SIGNIFICANT CHANGE UP (ref 32–36)
MCV RBC AUTO: 88.7 FL — SIGNIFICANT CHANGE UP (ref 80–100)
NRBC # BLD: 0 /100 WBCS — SIGNIFICANT CHANGE UP (ref 0–0)
PLATELET # BLD AUTO: 142 K/UL — LOW (ref 150–400)
POTASSIUM SERPL-MCNC: 3.9 MMOL/L — SIGNIFICANT CHANGE UP (ref 3.5–5.3)
POTASSIUM SERPL-SCNC: 3.9 MMOL/L — SIGNIFICANT CHANGE UP (ref 3.5–5.3)
RBC # BLD: 4.96 M/UL — SIGNIFICANT CHANGE UP (ref 4.2–5.8)
RBC # FLD: 15.2 % — HIGH (ref 10.3–14.5)
SODIUM SERPL-SCNC: 140 MMOL/L — SIGNIFICANT CHANGE UP (ref 135–145)
WBC # BLD: 9.64 K/UL — SIGNIFICANT CHANGE UP (ref 3.8–10.5)
WBC # FLD AUTO: 9.64 K/UL — SIGNIFICANT CHANGE UP (ref 3.8–10.5)

## 2022-01-11 PROCEDURE — C1713: CPT

## 2022-01-11 PROCEDURE — 73560 X-RAY EXAM OF KNEE 1 OR 2: CPT

## 2022-01-11 PROCEDURE — 88305 TISSUE EXAM BY PATHOLOGIST: CPT

## 2022-01-11 PROCEDURE — 85027 COMPLETE CBC AUTOMATED: CPT

## 2022-01-11 PROCEDURE — U0005: CPT

## 2022-01-11 PROCEDURE — 97116 GAIT TRAINING THERAPY: CPT

## 2022-01-11 PROCEDURE — 99231 SBSQ HOSP IP/OBS SF/LOW 25: CPT

## 2022-01-11 PROCEDURE — 97165 OT EVAL LOW COMPLEX 30 MIN: CPT

## 2022-01-11 PROCEDURE — C1889: CPT

## 2022-01-11 PROCEDURE — 97535 SELF CARE MNGMENT TRAINING: CPT

## 2022-01-11 PROCEDURE — U0003: CPT

## 2022-01-11 PROCEDURE — 27447 TOTAL KNEE ARTHROPLASTY: CPT

## 2022-01-11 PROCEDURE — 85014 HEMATOCRIT: CPT

## 2022-01-11 PROCEDURE — 97110 THERAPEUTIC EXERCISES: CPT

## 2022-01-11 PROCEDURE — C1776: CPT

## 2022-01-11 PROCEDURE — 88311 DECALCIFY TISSUE: CPT

## 2022-01-11 PROCEDURE — 85018 HEMOGLOBIN: CPT

## 2022-01-11 PROCEDURE — 97530 THERAPEUTIC ACTIVITIES: CPT

## 2022-01-11 PROCEDURE — 80048 BASIC METABOLIC PNL TOTAL CA: CPT

## 2022-01-11 PROCEDURE — 97161 PT EVAL LOW COMPLEX 20 MIN: CPT

## 2022-01-11 PROCEDURE — 36415 COLL VENOUS BLD VENIPUNCTURE: CPT

## 2022-01-11 RX ORDER — ACETAMINOPHEN 500 MG
2 TABLET ORAL
Qty: 0 | Refills: 0 | DISCHARGE
Start: 2022-01-11 | End: 2022-01-24

## 2022-01-11 RX ORDER — TRAMADOL HYDROCHLORIDE 50 MG/1
1 TABLET ORAL
Qty: 30 | Refills: 0
Start: 2022-01-11 | End: 2022-01-17

## 2022-01-11 RX ADMIN — Medication 50 MILLIGRAM(S): at 05:48

## 2022-01-11 RX ADMIN — Medication 100 MILLIGRAM(S): at 05:49

## 2022-01-11 RX ADMIN — Medication 1000 MILLIGRAM(S): at 14:11

## 2022-01-11 RX ADMIN — Medication 1000 MILLIGRAM(S): at 14:41

## 2022-01-11 RX ADMIN — Medication 0.5 MILLIGRAM(S): at 05:48

## 2022-01-11 RX ADMIN — Medication 1000 MILLIGRAM(S): at 06:38

## 2022-01-11 RX ADMIN — Medication 1000 MILLIGRAM(S): at 05:48

## 2022-01-11 RX ADMIN — PANTOPRAZOLE SODIUM 40 MILLIGRAM(S): 20 TABLET, DELAYED RELEASE ORAL at 05:49

## 2022-01-11 RX ADMIN — Medication 101.6 MILLIGRAM(S): at 06:39

## 2022-01-11 RX ADMIN — Medication 300 MILLIGRAM(S): at 12:51

## 2022-01-11 RX ADMIN — Medication 81 MILLIGRAM(S): at 05:48

## 2022-01-11 NOTE — OCCUPATIONAL THERAPY INITIAL EVALUATION ADULT - LEVEL OF INDEPENDENCE: DRESS UPPER BODY, OT EVAL
[facility] referral    Aware of  referral:                Yes    __x__                     No      ____             Reason for Referral          Unable to afford her 3 inhalers next month when she is in the donut hole. $1000 estimated by patient.      Housing:        Nutrition:       Financial: States she will be in the donut hole next month. Is on 3 inhalers, and believes they will cost around $1000.     Employment:    Transportation:    Advance Directives:    Legal:    Family/ Patient Coping:    Insurance:    Supportive in home services:    End of Life Planning:      Additional Information:      RNCC next outreach: 2 days.             
RNCC Care Coordination Note    Encounter Summary:  RN supportive call.  Patient states she is doing well since being on her 3 inhalers. She states, \"However, next month I am going to be in the donut hole, and they will cost me $1000.  I can't afford that, and won't be taking them then.\"  RN will put in referral for  for financial assistance.  Jaron     RNCC Assessments    See Care Coordination Smartform for complete baseline and current assessment.       Acute Pain Assessment         Chronic Pain Assessment    Chronic Pain:  Yes  Chronic Pain Narrative:  Bilateral knees.   Back pain.       RNCC Interventions          Coordinated Referrals:  Yes            Patient Education:  Yes   Recipients of Education:  Patient   Education Methods used this encounter:  Verbal   Action Plan:  Yes   Daily Maintenance, Sick Day Plan, Emergency Plan   Decision Making support, Disease Process, Medication Education, Wellness/Lifestyle, S/Sx Infection, Smoking Cessation Education   Reinforce Care Plan   Interventions Narrative:  RN over the phone assessment.   referral placed.       Acuity Assessment     Acuity Assessment:   Date Acuity Level   5/23/18 1             
independent

## 2022-01-11 NOTE — DISCHARGE NOTE NURSING/CASE MANAGEMENT/SOCIAL WORK - PATIENT PORTAL LINK FT
You can access the FollowMyHealth Patient Portal offered by [facility] by registering at the following website: [email or web address]. By joining [employer]’s FollowMyHealth portal, you will also be able to view your health information using other applications (apps) compatible with our system.

## 2022-01-11 NOTE — OCCUPATIONAL THERAPY INITIAL EVALUATION ADULT - LEVEL OF INDEPENDENCE: DRESS LOWER BODY, OT EVAL
dependent (less than 25% patients effort) Pt educated on LB dressing technique, pt reports spouse will assist as needed/minimum assist (75% patients effort)

## 2022-01-11 NOTE — OCCUPATIONAL THERAPY INITIAL EVALUATION ADULT - ADDITIONAL COMMENTS
Pt lives in private home with his wife. 1 HENOK 5 steps inside to bedroom and bathroom. +tub with grab bars. Pt owns a RW, SAC, and RTS. Pt was independent with ADLs prior to surgery and performed functional mobility without any AD. Pt lives in private home with his wife. 1 HENOK 5 steps inside to bedroom and bathroom. +tub with grab bars. Pt owns a RW, SAC, and RTS.

## 2022-01-11 NOTE — CONSULT NOTE ADULT - SUBJECTIVE AND OBJECTIVE BOX
Patient is an 80 yo M with PMH of hypertension and hyperlipidemia and R knee TKA who presented for admission for Left knee arthroplasty.  Patient is an 80 yo M with PMH of HTN, HLD, gout, and R knee replacement 6/21 who presented for admission for Left knee arthroplasty. Patient has a hx of arthritis, not currently taking any pain medicaiton, reported intermittent discomfort with weight bearing prior to admission. Patient reports taking home BP med and statin. Denies other PMH. Seen ambulating in room with walker, reports some soreness of leg, denies numbness or tingling or falls.    REVIEW OF SYSTEMS:  CONSTITUTIONAL: No fever  RESPIRATORY: No cough, No shortness of breath  CARDIOVASCULAR: No chest pain, palpitations, dizziness, or leg swelling  GASTROINTESTINAL: No abdominal pain  NEUROLOGICAL: No headaches, memory loss, loss of strength, numbness, or tremors  MUSCULOSKELETAL: Soreness in L knee. No muscle, back, or extremity pain          PHYSICAL EXAM:  Vital Signs Last 24 Hrs  T(C): 36.4 (11 Jan 2022 07:46), Max: 36.7 (10 Fred 2022 15:45)  T(F): 97.6 (11 Jan 2022 07:46), Max: 98.1 (10 Fred 2022 15:45)  HR: 66 (11 Jan 2022 07:46) (63 - 78)  BP: 154/75 (11 Jan 2022 07:46) (105/49 - 155/71)  BP(mean): --  RR: 18 (11 Jan 2022 07:46) (12 - 24)  SpO2: 97% (11 Jan 2022 07:46) (95% - 98%)    GENERAL: NAD, well-groomed, well-developed  HEAD:  Atraumatic, Normocephalic  EYES: EOMI, PERRLA, conjunctiva and sclera clear  ENMT: Moist mucous membranes, Good dentition  NERVOUS SYSTEM:  Alert & Oriented X3, Good concentration;   CHEST/LUNG: Clear to auscultation bilaterally; No rales, rhonchi, wheezing, or rubs  HEART: Regular rate and rhythm; No murmurs, rubs, or gallops  ABDOMEN: Soft, Nontender  EXTREMITIES:  2+ Peripheral Pulses, No clubbing, cyanosis, or edema  LYMPH: No lymphadenopathy noted  SKIN: No rashes or lesions

## 2022-01-11 NOTE — PROGRESS NOTE ADULT - SUBJECTIVE AND OBJECTIVE BOX
Discharge medication calendar:  (ASA 81mg Qday preop)  Aspirin EC 81mg q12h x 6 weeks then resume ASA 81mg Qday  APAP 1000mg q8h x 2-3 weeks  No NSAID (CKD)  Omeprazole 20mg QAM x 6 weeks  Narcotic PRN  Docusate 100mg TID while taking narcotic  Miralax, Senna, or Bisacodyl PRN for treatment of constipation

## 2022-01-11 NOTE — CONSULT NOTE ADULT - ASSESSMENT
Patient is an 82 yo M with PMH of HTN, HLD, who presented s/p L knee replacement.     L knee replacement  -pt ambulating in room with walker, seen by PT/OPT with plan for home care  -pain control as per ortho team    Hypertension  -continue home  Patient is an 82 yo M with PMH of HTN, HLD, who presented s/p L knee replacement.     L knee replacement  -pt ambulating in room with walker, seen by PT/OPT with plan for home care  -pain control as per ortho team, VTE prophylaxis as per ortho team  -patient is medically stable for discharge to home with home care services    Hypertension  -continue home amlodipine, metoprolol    hyperlipidemia  -continue home simvastatin    history of gout  -continue home allopurinol  -will defer uric acid labs to outpatient

## 2022-01-11 NOTE — OCCUPATIONAL THERAPY INITIAL EVALUATION ADULT - TRANSFER TRAINING, PT EVAL
Patient will transfer to toilet with DME as needed I'ly within 2-3 sessions. Patient will transfer to toilet with DME as needed I'ly within 2-3 sessions. 2. pt will perform tub transfer with Cg within 1-2 sessions

## 2022-01-11 NOTE — PROGRESS NOTE ADULT - SUBJECTIVE AND OBJECTIVE BOX
Post Op Day #1    SUBJECTIVE    80yo Male status post left TKR .   Patient is alert and comfortable.    Pain is controlled with current pain regimen.  Denies nausea, vomiting, chest pain, shortness of breath, abdominal pain or fever.   No new complaints.    OBJECTIVE    Vital Signs Last 24 Hrs  T(C): 36.4 (11 Jan 2022 07:46), Max: 36.7 (10 Fred 2022 15:45)  T(F): 97.6 (11 Jan 2022 07:46), Max: 98.1 (10 Fred 2022 15:45)  HR: 66 (11 Jan 2022 07:46) (63 - 79)  BP: 154/75 (11 Jan 2022 07:46) (105/49 - 178/81)  BP(mean): --  RR: 18 (11 Jan 2022 07:46) (12 - 24)  SpO2: 97% (11 Jan 2022 07:46) (95% - 98%)  I&O's Summary    10 Fred 2022 07:01  -  11 Jan 2022 07:00  --------------------------------------------------------  IN: 1920 mL / OUT: 550 mL / NET: 1370 mL        Left knee incision is clean, dry and intact.   No erythema/ No exudate/ No blistering/ No ecchymosis.   The calf is supple/nontender.   Sensation to light touch is grossly intact distally.   Motor function distally is intact.   No foot drop.   (2+) dorsalis pedis pulse. Capillary refill is less than 2 seconds. No cyanosis.                          14.8   9.64  )-----------( 142<L>    ( 11 Jan 2022 06:35 )             44.0   11 Jan 2022 06:35                        14.1   x     )-----------( x        ( 10 Fred 2022 20:50 )             41.7   10 Fred 2022 20:50    11 Jan 2022 06:35    140    |  105    |  17     ----------------------------<  100<H>  3.9     |  31     |  1.38<H>  10 Fred 2022 20:50    138    |  104    |  21     ----------------------------<  206<H>  3.7     |  24     |  1.58<H>    Ca    8.7        11 Jan 2022 06:35  Ca    8.6        10 Fred 2022 20:50        ASSESSMENT AND PLAN    - Orthopedically stable  - DVT prophylaxis: PAS + Ecotrin 81mg twice daily  - Continue physical therapy and occupational therapy  - Weight bearing as tolerated of the left lower extremity with assistance of a walker  - Incentive spirometry encouraged  - Pain control as clinically indicated  - Disposition:  Home when medically stable and cleared by PT/OT

## 2022-01-11 NOTE — DISCHARGE NOTE NURSING/CASE MANAGEMENT/SOCIAL WORK - NSDCPEFALRISK_GEN_ALL_CORE
For information on Fall & Injury Prevention, visit: [email or web address] OR  [email or web address] OR  https://www.cdc.gov/steadi/patient.html

## 2022-01-20 ENCOUNTER — NON-APPOINTMENT (OUTPATIENT)
Age: 82
End: 2022-01-20

## 2022-01-24 ENCOUNTER — APPOINTMENT (OUTPATIENT)
Dept: ORTHOPEDIC SURGERY | Facility: CLINIC | Age: 82
End: 2022-01-24

## 2022-01-27 ENCOUNTER — APPOINTMENT (OUTPATIENT)
Dept: ORTHOPEDIC SURGERY | Facility: CLINIC | Age: 82
End: 2022-01-27
Payer: MEDICARE

## 2022-01-27 VITALS — DIASTOLIC BLOOD PRESSURE: 81 MMHG | HEART RATE: 73 BPM | SYSTOLIC BLOOD PRESSURE: 134 MMHG

## 2022-01-27 PROCEDURE — 99024 POSTOP FOLLOW-UP VISIT: CPT

## 2022-01-27 PROCEDURE — 73562 X-RAY EXAM OF KNEE 3: CPT | Mod: LT

## 2022-01-27 RX ORDER — AMOXICILLIN 500 MG/1
500 CAPSULE ORAL
Qty: 12 | Refills: 2 | Status: ACTIVE | COMMUNITY
Start: 2022-01-27 | End: 1900-01-01

## 2022-01-27 NOTE — HISTORY OF PRESENT ILLNESS
[___ Weeks Post Op] : [unfilled] weeks post op [1] : the patient reports pain that is 1/10 in severity [Clean/Dry/Intact] : clean, dry and intact [Swelling] : swollen [Neuro Intact] : an unremarkable neurological exam [Vascular Intact] : ~T peripheral vascular exam normal [Negative Choco's] : maneuvers demonstrated a negative Choco's sign [Xray (Date:___)] : [unfilled] Xray -  [Doing Well] : is doing well [No Sign of Infection] : is showing no signs of infection [Adequate Pain Control] : has adequate pain control [Chills] : no chills [Diarrhea] : no diarrhea [Fever] : no fever [Nausea] : no nausea [Vomiting] : no vomiting [Erythema] : not erythematous [Discharge] : absent of discharge [Dehiscence] : not dehisced [de-identified] : Patient is a 81 year old male S/P left total knee  replacement performed at [facility] on 1/10/22. Patient presents today for his first post operative visit and Dermabond tape/suture removal. [de-identified] : The patient was discharged from the hospital to home with physical therapy and home exercises. He verbalized doing well overall. The patient reports current pain of 1/10 but increases during activities. He is taking  Tylenol  and Oxycodone for pain relief. Patient is using EC. Aspirin 81 MG twice a day for DVT prophylaxis. He is ambulating independently. [de-identified] : The patient is ambulating with mild antalgic gait independently. Patient is S/P left total knee replacement. The knee is with Dermabond tape and one suture at the distal end of the  incision intact. The surgical incision site is without redness, heat or drainage. No palpable hematoma or effusion. No calf tenderness. Positive distal pulses. The active ROM of the left knee is from -10-90 degrees. No evidence of ligament laxity, muscle atrophy, motor or sensory deficit. No flexion contracture or extension lag noted. The left lower extremity is with mild swelling. There is no evidence of infection or cellulitis on the incision site. [de-identified] : 3 views of the left knee was obtained at today's visit. X-rays reveal a well-positioned, well fixed total knee replacement in good alignment. There is no obvious evidence of fractures, dislocation or osteolysis. [de-identified] : Left total knee replacement. [de-identified] : The suture and Dermabond were removed from the left knee and replaced with Steri-Strips. Patient tolerated  it well. Incisional care was reviewed with the patient. Patient was advised on the nature of the healing process and on the importance of adhering to the DVT prophylaxis with Aspirin 81 MG BID for a total of 6 weeks post operative. Patient to continue with physical therapy and home exercises as recommended. Prescription was given for out patient physical therapy. Patient was encouraged to engage in isometric exercises to assist the knee to come to full extension. He was advised to continue to apply ice to the knee and keep the left lower extremity elevated above the level of the heart to decrease swelling. Prescription was given for antibiotics Amoxicillin for dental prophylaxis as per Dr. Kruger's protocol. Patient to make a follow up appointment to see Dr. Kruger in 6 weeks. He was educated on the signs and symptoms of infection to report. Patient verbalized understanding of all instructions and all of his questions were addressed to his satisfaction. Patient was advised to call this office if he has questions or concerns.

## 2022-03-04 NOTE — ED PROVIDER NOTE - CPE EDP EYES NORM
[de-identified] : She missed on appointment for procrit last week.\par She starts her next cycle on 6/25/18\par C/o back pain radiating down her left which occurred when she bent to  something.\par No change in diarrhea.\par \par 7/31/18:\par Doing well. On Revlimid for more than 2yrs, 5 mg 2 weeks on 1 week off. She will be starting week on tonight. \par C/o diarrhea. On Imodium 2 pills AM and 2 pills PM. Doesn't help much with diarrhea. \par C/o nausea, abdominal pain. \par \par \par Colonoscopy in 2016 was normal. \par Did not have blood transfusion for over 2 years. \par On procrit [id number] weekly. Missed last week on 7/24/18 as she was out of town. She has been non compliant with weekly Procrit.\par Mammogram in 2017 was normal. \par \par 9/11/18\par pt is here for follow up.\par She feels the lomotil helps with the diarrhea.\par She was away for a few weeks and missed her procrit injections.\par No fever, abdominal  discomfort has been less since since use of lomotil.\par She started a new cycle 2 days ago.\par \par 10/2/18:\par She will start Revlimid tonight (week on). 2 weeks on, 1 week off. \par On ASA 81mg. \par Denies fever, nausea, vomiting, chest pain, SOB, abdominal pain, and bladder problems.\par C/o diarrhea. \par S/p 2 units PRBC transfusion on 9/25/18. \par On Procrit 61123 units weekly. Not compliant every week. \par C/o intermittent dizziness. \par \par 10/31/18\par Pt is here for follow up.\par C/O significant fatigue.\par Continues to have diarrhea, takes imodium and lomotil as needed.\par C/o dry cough, no fever.\par Cough started a month ago. It is worse in the mornings however over last week it has been constant all day.\par No chest pain.\par She was found to have low B12 levels and was started on B12 injections.\par \par 11/27/18:\par Doing well. Hospitalized for 3 days for cellulitis/abcess of the back s/p drainage and on Abx currently. Developed 3 days after Mg infusion as per pt. \par Denies nausea, vomiting, chest pain, SOB, abdominal pain, bowel and bladder problems.\par This is the week on Revlimid (week 1).\par S/p 1 unit PRBC transfusion in the hospital. \par On Procrit weekly \par \par 12/27/18:\par Doing well. No major complaints.\par Denies fever, nausea, vomiting, chest pain, SOB, abdominal pain, and bladder problems.\par On Revlimid 5 mg 2 weeks on 1 week off. This is the week off. She will start the week on sunday (12/30/18).\par Due for Procrit 04751 units today. \par Still has diarrhea. 4-5bm/day.\par On monthly B12 injections. \par \par 1/30/19:\par Patient here for follow up for MDS.  She is taking Revlimid 5 mg, 2 weeks on, 1 week off.  She will complete this current cycle on Saturday.  She has no new complaints today.  Patient denies cough, shortness of breath, denies fever, denies bone pain.\par \par 03/04/2019\par Patient is here for a follow up visit. She complaints of severe pain in her left shoulder and has had abscess drained 2 weeks ago. However the pain is worse and she has purulent discharge on examination. She also has Nasal sores that are painful. 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  Denies Fever. \par She also complaints of swelling in her right leg. Not tender and not erythematous and negative Gong;s sign. \par Her diarrhea with Revlimid is ongoing and Imodium and Lomotil helps but not everyday. \par She is on 60,000 units of procrit weekly and Revlimid 5 mg 2 weeks on 1 week off. \par \par 4/23/19\par Pt is here for follow up.\par She feels well.\par The nasal sores have improved with bactroban\par The abscess on left shoulder has resolved.\par However she has a new one on the middle of her back.\par No fever.\par Pt has been on procrit 91313 units weekly, however she missed a dose in between.\par \par 5/8/19\par pt is here for follow up.\par no new complaints.\par feels well.\par Her skin abscess has resolved.\par she has been unable to go to ID, as she could not get an appt.\par No bleeding.\par She is compliant with revlimid.\par \par 6/6/19\par Pt is here for follow up.\par no new complaints \par Feels well.\par Is on week 2 of her Revlimid cycle. \par No transfusions since last visit\par \par 7/17/19\par Pt is here for follow up.\par C/O fatigue.\par Was away for a few days two weeks ago, but missed treatment with procrit for 2 weeks.\par Is complaint with Revlimid 2 weeks on 1 week off.\par Diarrhea is a little improved.\par \par 8/14/19\par Patient here for follow up visit, feeling well.  Although she is complaining of some pain at the left scapula area recently.  Patient denies cough, shortness of breath, denies fever, denies other bone pain.  She is off Revlimid  this week, due to restart on Monday.  She is scheduled for Procrit and Vitamin B12 injection today.  She plans to leave the country tomorrow to visit her mother who is ill.  She will return in about 10 days.\par \par 9/11/19\par Pt is here for follow up.\par She was away for 3 weeks, out of which she took procrit for 2 weeks in [place].\par She missed last week's dose.\par She had CBCs in [place] which showed Hgb of 8.9\par She feels well.\par She still getting diarrhea.\par She has been using lomotil with very minimal relief.\par She started a new cycle of Revlimid 4 days ago.\par \par 10/3/19\par Patient here for follow up visit, feeling fairly well.   Patient denies cough, shortness of breath, denies fever, denies other bone pain.  She remains on Revlimid.  She is scheduled for Procrit and Vitamin B12 injection today.\par \par 10/24/19\par Pt is here for follow up.\par Feels well.\par No SOB, fatigue.\par Compliant with procrit and Revl;imid.\par Remains on ASa.\par Diarrhea is unchanged.\par Pt takes imodium as needed.\par \par 11/14/19\par Pt is here for follow up.\par No new complaints.\par Starts a new cycle of Revlimid today.\par Diarrhea is same as before, no rectal bleeding.\par No fever.\par \par 12/5/19\par Pt is here for follow up.\par No new complaints.\par Denies feeling weaker than usual.\par No fever, SOB.\par No change in frequency of diarrhea.\par No pain.\par Will start next cycle of Revlimid in 3 days.\par \par \par !/16/20\par Pt was recently DCed from [facility] 3 days ago after being treated for pneumonia and MARCO.\par She is on po Levaquin.\par She restarted Revlmid 3 days ago.\par She is feeling better now. No fever.\par No SOB, Chest pain and back pain has resolved.\par Pt has a cough, no expectoration.\par She also recd a unit of PRBCs last week in the hospital\par \par \par 1/30/20\par Patient here for follow up visit, feeling well.  She has no new complaints. Cough is almost gone completely.  Patient denies shortness of breath, denies fever, denies bone pain.  Her appetite is ok, weight is stable.  She is due to restart Revlimid on Monday.\par \par 02/20/20\par Pt is here for follow up, feeling well.\par Offers no new complaints. Denies SOB, fever, chills, weight loss, CP, cough. \par Currently off week of Revlimid 5 mg. Restarts on Monday.\par Has procrit 80,000 units today. Next b 12 injection due in 2 weeks \par Did not get chest Xray\par CBC reviewed WBC 3.1, h/h 8.3/25%, plt 386, neutrophils 1.29\par \par 3/12/20\par Pt is here for follow up.\par She took Revlimid for 2 weeks and then has been off for one week although she was advised to take it daily without break.\par No SOB, Cough, fever.\par Has mild fatigue.\par \par 04/02/2020\par SIMONA KUHN a 73 year F is here today for follow up visit of MDS.\par Denies fever, chills, cough,night sweats, weight loss. \par Denies bleeding or bruising.\par Pt receives procrit 80,000 units weekly and B12 IM monthly. \par Continued Revlimid 5 mg daily x 3 weeks, has 1 dose left tonight. \par CBC reviewed: WBC 3.2, H/H 8.1/25.2, neutrophils 1.6, PLT 72\par \par 4/20/2020: The patient is here for follow up . She has no complaints of fever, chills, dizziness , chest pain and shortness of breath . She is still with diarrhea , up to 10 watery BMs per day, responds poorly to imodium and lomotil. She is on Revlimid 5 mg daily , took last dose yesterday night. Her last Procrit was on April 13. \par \par 5/26/20\par Pt is here for follow up.\par She is feeling well. Denies SOB, chest pain, fever.\par Continues to have diarrhea although she is off of Revlmid.\par Thinks it may due to diabetic meds.\par Wants to discuss BM bx results\par \par 6/22/20\par Pt is here for follow up.\par Feels well. Denies any fever, N, V, D\par Continues to have diarrhea, she will talk to her PCP about changing metformin for DM.\par Has been needing PRBCs 1 unit each month\par \par 7/20/20\par Pt is here for follow up.\par No new complaints. Has been feeling well.\par No SOB, CP. \par No N, V, D.\par She has recd 2 units of PRBCs since May 20.\par \par 8/17/20\par Pt is here for a follow up visit.\par She is feeling well.\par No new symptoms. No N, V, D.\par No bruising or bleeding. She continues to need PRBCs every 2-3 weeks.\par \par 8/31/20\par Pt is here for follow up. She is feeling well. No N, V, D.\par She is tolerating the hydrea well. No SOB, CP\par No bruising ior bleeding\par \par 9/8/20\par Pt is here for follow up.\par She had been contacted by the [facility] last week to advise her to stop the hydrea. Pt did not check her messages and continued with Hydrea.\par No fever, N, V, bleeding.\par Saw Dr Ferrell last week.\par \par 9/14/20\par Pt is here for folow up.\par Besides her usual complaint of diarrhea she is feeling well.\par Recd 1 unit PRBC last week.\par Has stopped Hydrea.\par No fever, mouth sores.\par \par 9/29/20\par Pt is here for folow up.\par No new complaints.\par Is seeing GI for diarrhea net week.\par No fever, night sweats.\par REcd 3 units of PRBC this month\par \par 10/13/20\par Pt is here for follow up.\par No new complaints.\par Has not needed a transfusion since 3 weeks ago.\par Continues to have diarrhea, waiting to be seen by GI\par \par 10/26/20\par Pt is here for follow up.\par C/O feeling fatigued.\par Has CLARK.\par No nausea or vomiting.\par No fever.\par Diarrhea has improved a little. She is no longer on Metformin\par \par 11/9/20\par Pt is here for follow up.\par Feels well. Denies SOB, CP, fatigue\par \par 12/7/20- Patient is for follow up and is due for cycle 9 of Vidaza.  She still has loose BM sometimes 10 times a day and was seeing Dr. Corey from GI- did not follow up recently and is unable to get an appt with him. She has lost about 10 lbs since September. No N/V. No fever or chills. No CP/SOB. She has been getting PRBC transfusion every 3 weeks now\par \par 01/04/20 Pt presented today for f/u visit . She is s/p 8 cycles of vidaza and was started on Luspatercept in Dec , 2020 . So far she received 1 injection , she is due for 2nd injection today . Adverse effect profile was discussed with her in detail , she reports having some dizziness and weakness after first injection . She received blood transfusion last wk . Blood work from today reviewed as well and counts are adequate . She denies any fevers,  chills,  or any new symptoms . \par \par 1/25/21\par Pt is here for follow up.\par C/O diarrhea, otherwise feeling better.\par Has not needed a transfusion since 12/29\par \par 2/16/21\par Pt is here for follow up.\par Needed transfusion on 2/8/21.\par Continues to C/o fatigue. Diarrhea remains the same, has not made GI appt as yet.\par \par 3/8/21\par Pt is here for follow up.\par Feels better today. Recd 1 unit PRBCs last week.\par Diarrhea is same as before. has an appt with GI next week.\par No fever\par \par 3/30/21\par Pt is here for follow up.\par Feels better. Last transfusion was on 3/2/21\par Saw GI and was started on cholestyramine and metformin was DCED with resolution of diarrhea.\par She denies any SOB,\par Fatigue is better\par \par 4/20/2021\par Pt is here to f/u for MDS\par She is s/p Luspatercept cycle #6\par She is compliant with Aspirin\par She feels better today, appetite is good\par She denies shortness of breath, fatigue, or weakness \par She c/o of diarrhea but it has improved since she was started on Cholestyramine. Stool is soft and less in frequency\par She received COVID vaccine x 2 doses\par \par 5/11/21\par pt is here for follow up.\par Feels the same with fatigue, diarrhea.\par No SOB\par \par 6/21/21\par Pt is here for follow up.\par Feels well. No SOB, CP, N< V.\par Diarrhea is better controlled now.\par Last PRBC transfusion was 2 weeks ago.\par \par 7/19/21\par Pt is here for follow up.\par Feels a little tired, last transfusion was 6/1/21.\par No bleeding, fever, SOB\par \par 8/10/2021\par Patient is here to follow up for her MDS.\par She is on Luspatercept, tolerating well.\par She feels well today, the appetite is good.\par She denies shortness of breath, fatigue, fever, night sweats, abdominal pain, arthralgias/myalgias.\par \par 8/31/21\par Pt is here for follow up.\par C/O feeling very fatigued.\par No bleeding.\par No fever.\par \par 9/21/2021\par Patient is here to follow up for MDS.\par She is on Luspatercept and Retacrit, tolerating well.\par She gets blood transfusion as needed for Hgb <7 gm/dL\par She is c/o of fatigue, no shortness of breath, dizziness, chills or lightheadedness.\par She denies melena or hematochezia.\par Her appetite is good, no nausea/vomiting.\par \par 10/28/2021\par Patient is here to follow up for MDS.\par She is on Luspatercept and Retacrit, tolerating well.\par She gets blood transfusion to keep Hgb > 7 gm/dL.\par She is c/o of chronic fatigue, no shortness of breath, dizziness, chills or lightheadedness.\par She denies melena or hematochezia.\par Her appetite is good, no nausea/vomiting.\par She c/o of severe diarrhea, 10-12x/day watery stool while on Imodium in the last 2 weeks.\par Last colonoscopy was in 2016, benign as per patient.\par \par 11/18/21\par Pt is here for follow up. C/O fatigue. No SOB, CP\par \par 12/9/21\par Pt is here for follow up.\par Feels better today. Recd 2 units last week in ER.\par Planning to go to [place] for over a week and does not want to miss her procrit dose.\par Diarrhea is stable,\par \par 12/23/21\par Pt is here for follow up.\par Feels better. No SOB, CP. Going to [place] tomorrow. Has not been able to get Procrit injection from the pharmacy yet d/t insurance issues\par \par 2/3/22\par Pt is here for follow up. Feeling same as usual. No SOB, CP. Last transfusion was 2 weeks ago in ER>\par No bleeding reported\par 3/3/22\par Pt is here for follow up.\par C/O fatigue. Had black stools X2 [de-identified] : \par  normal...

## 2022-03-08 ENCOUNTER — APPOINTMENT (OUTPATIENT)
Dept: ORTHOPEDIC SURGERY | Facility: CLINIC | Age: 82
End: 2022-03-08
Payer: MEDICARE

## 2022-03-08 VITALS
HEIGHT: 66 IN | WEIGHT: 197 LBS | HEART RATE: 61 BPM | BODY MASS INDEX: 31.66 KG/M2 | SYSTOLIC BLOOD PRESSURE: 146 MMHG | DIASTOLIC BLOOD PRESSURE: 77 MMHG

## 2022-03-08 DIAGNOSIS — Z96.652 PRESENCE OF LEFT ARTIFICIAL KNEE JOINT: ICD-10-CM

## 2022-03-08 PROCEDURE — 73562 X-RAY EXAM OF KNEE 3: CPT | Mod: LT

## 2022-03-08 PROCEDURE — 99024 POSTOP FOLLOW-UP VISIT: CPT

## 2022-03-11 PROBLEM — Z96.652 S/P TOTAL KNEE REPLACEMENT USING CEMENT, LEFT: Status: ACTIVE | Noted: 2022-01-24

## 2022-03-11 RX ORDER — OMEPRAZOLE 20 MG/1
20 CAPSULE, DELAYED RELEASE ORAL
Qty: 30 | Refills: 0 | Status: ACTIVE | COMMUNITY
Start: 2022-01-10

## 2022-03-11 RX ORDER — TRAMADOL HYDROCHLORIDE 50 MG/1
50 TABLET, COATED ORAL
Qty: 30 | Refills: 0 | Status: ACTIVE | COMMUNITY
Start: 2022-01-11

## 2022-03-11 RX ORDER — LEVOTHYROXINE SODIUM 0.03 MG/1
25 TABLET ORAL
Qty: 30 | Refills: 0 | Status: ACTIVE | COMMUNITY
Start: 2021-12-21

## 2022-03-11 RX ORDER — LORAZEPAM 0.5 MG/1
0.5 TABLET ORAL
Qty: 60 | Refills: 0 | Status: ACTIVE | COMMUNITY
Start: 2022-03-02

## 2022-10-06 NOTE — ED PROVIDER NOTE - QRS
"[facility]   Physical Therapy Treatment Note     Name: Marleni Sousa  Clinic Number: [id number]    Therapy Diagnosis:   No diagnosis found.      Physician: Dread Mckinney MD    Visit Date: 10/6/2022    Physician Orders: PT Eval and Treat   Medical Diagnosis: primary OA bilateral knees   Evaluation Date: 8/10/2022  Authorization period Expiration: 12/31/2022  Plan of Care Certification Period: 11/5/2022     Visit #: 11 / Visits authorized: 12    PTA Visit #:  0/5    Time In: 2:52 PM  Time Out: 3:43 PM  Total Billable Time: 45 minutes    SUBJECTIVE     Pt reports: her left knee is doing better than her right knee, even her back is doing better since starting physical therapy  She was not compliant with home exercise program as one has not been issued yet.   Response to previous treatment: good  Functional change: improving balance    Pain: 3/10   Location: bilateral knee      OBJECTIVE     Objective Measures updated at progress report unless specified.     Treatment     HOMA received the treatments listed below:      therapeutic exercises to develop strength, endurance, ROM and flexibility for 45 minutes including:  Recumbent Bike level 5 x 15 min   Heel Raises x 20  Toe Taps on 6" step x 2 min  Step-ups on 6" step x 10 ea  Standing Hip Flex/Abd/Ext x 10 ea from 6" step  Side steps x-lt band x 2 min in // bars  Sit to Stand with green thera-band at knees x 15  Bridges with green thera-band at knees x 15  Ball Squeezes x 2 min  SLR with 1.5# 10 x 2 ea  SAQ on small gray roll with 1.5# x 2 min       Patient Education and Home Exercises     Home Exercises Provided and Patient Education Provided     Education provided:   -supporting lumbar spine with sitting   -quad sets for knees     Written Home Exercises Provided:  Exercises were reviewed and HOMA was able to demonstrate them prior to the end of the session.  HOMA demonstrated good  understanding of the education provided. See EMR " under Patient Instructions for exercises provided during therapy sessions    ASSESSMENT     Patient tolerated treatment without complication or increase pain reported. HOMA Is progressing well towards her goals. Will reassess progress towards goals at next visit.    Pt prognosis is Good.     Pt will continue to benefit from skilled outpatient physical therapy to address the deficits listed in the problem list box on initial evaluation, provide pt/family education and to maximize pt's level of independence in the home and community environment.     Pt's spiritual, cultural and educational needs considered and pt agreeable to plan of care and goals.     Anticipated barriers to physical therapy: None    Goals:   Short Term Goals: 3 weeks Goals met  Pain: reduce max pain levels to no more than 5/10 in order to improve performance of daily activities   Demonstrate compliance with initial exercise program     Long Term Goals: 6 weeks     Pain: Decrease pain to no more than 3/10 to allow for increased standing, walking, and performance of daily activities.   Strength: Improve strength in bilateral LE's  to 5/5 for improved LE stability  Functional scale: Improve score on FOTO - balance  to 40% limitation   Lifting: Lift a bag of groceries from trunk and carry up steps into house without pain, compensation or LOB.   Walking: Increase walking distance to 1/4 mile without pain  Postures: Increase standing duration to > 20 mins without pain   Transfers: Perform sit to stand from chair transfers without increased pain or limitation  Exercise: demonstrate independence with home exercise program to maintain gains made in therapy.         PLAN     Continue per POC and progress as tolerated to improve strength and mobility.     Sergey Hummel, PT                        84 ms

## 2023-06-02 NOTE — ASU PATIENT PROFILE, ADULT - PATIENT'S HEIGHT AND WEIGHT RECORDED IN THE VITAL SIGNS FLOWSHEET
Admission Status; Secondary Review Determination     Admission Date: 6/1/2023  2:58 PM       Under the authority of the Utilization Management Committee, the utilization review process indicated a secondary review on the above patient.  The review outcome is based on review of the medical records, discussions with staff, and applying clinical experience noted on the date of the review.        (x)      Inpatient Status Appropriate - This patient's medical care is consistent with medical management for inpatient care and reasonable inpatient medical practice.       RATIONALE FOR DETERMINATION      Brief clinical presentation, information copied from the chart, abbreviated and edited for relevant content:     Patient is a 81 year old female with a history of ESRD on HD, recurrent GI bleeding, gastric/small bowel/cecal AVMs, Hep C s/p treatment, compensated cirrhosis who was transferred [facility] with lethargy/weakness and acute on chronic anemia. She is known to have small bowel AV malformations dating back to 2014, she has experienced melena or bright red bleeding per rectum whenever there was active bleeding from these lesions. She has been on monthly octreotide infections since 2019 and has done relatively well until Jan 2023. Recently admitted with Hb was 5.6, she was evaluated by [facility], underwent EGD which was negative for any active source of bleeding. Following this, VCE was ordered by her primary gastroenterologist  in 2/2023 and this revealed actively bleeding small bowel AV malformations. On 3/9/23 she underwent device assisted upper endoscopy and APC was performed on AVMs in the stomach, duodenum and jejunum.   Patient has failed medical/conservative management with Octreotide infusions and has had multiple GI interventions in the past, including recently. At this point, there is no point in further GI interventions, and patient should discuss about Palliative care options and goals of care. The one  last option that could be considered to be offered is Bevacizumab, that is sometimes offered in patients with HHT with severe, recurrent GI bleeding resistant to multiple transfusions/interventions. Plan to keep IV access, type and screen and follow Hgb, transfuse if less than 7, IV PPI BID, IVF as needed until HemeOnc consult and ongoing plan determined. Not safe for discharge.         At the time of admission with the information available to the attending physician, more than 2 nights hospital complex care was anticipated. Also, there was a risk of adverse outcome if patient was treated outpatient or observation. High intensity of services anticipated. Inpatient admission appropriate based on Medicare guidelines.       The information on this document is developed by the utilization review team in order for the business office to ensure compliance.  This only denotes the appropriateness of proper admission status and does not reflect the quality of care rendered.         The definitions of Inpatient Status and Observation Status used in making the determination above are those provided in the CMS Coverage Manual, Chapter 1 and Chapter 6, section 70.4.      Sincerely,      Blanquita Mitchell MD   Utilization Review/ Case Management  [facility].           yes

## 2023-07-25 NOTE — H&P PST ADULT - NSICDXPASTSURGICALHX_GEN_ALL_CORE_FT
Xolair Pregnancy And Lactation Text: This medication is Pregnancy Category B and is considered safe during pregnancy. This medication is excreted in breast milk. 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

## 2025-08-16 ENCOUNTER — EMERGENCY (EMERGENCY)
Facility: HOSPITAL | Age: 85
LOS: 0 days | Discharge: HOME | End: 2025-08-16
Attending: FAMILY MEDICINE
Payer: MEDICARE

## 2025-08-16 VITALS
SYSTOLIC BLOOD PRESSURE: 171 MMHG | DIASTOLIC BLOOD PRESSURE: 80 MMHG | OXYGEN SATURATION: 98 % | RESPIRATION RATE: 20 BRPM | TEMPERATURE: 98 F | HEART RATE: 80 BPM

## 2025-08-16 VITALS
DIASTOLIC BLOOD PRESSURE: 96 MMHG | OXYGEN SATURATION: 99 % | TEMPERATURE: 98 F | RESPIRATION RATE: 18 BRPM | HEIGHT: 67 IN | SYSTOLIC BLOOD PRESSURE: 162 MMHG | WEIGHT: 210.1 LBS | HEART RATE: 68 BPM

## 2025-08-16 DIAGNOSIS — Z98.89 OTHER SPECIFIED POSTPROCEDURAL STATES: Chronic | ICD-10-CM

## 2025-08-16 DIAGNOSIS — R07.89 OTHER CHEST PAIN: ICD-10-CM

## 2025-08-16 DIAGNOSIS — E66.3 OVERWEIGHT: ICD-10-CM

## 2025-08-16 DIAGNOSIS — Z96.651 PRESENCE OF RIGHT ARTIFICIAL KNEE JOINT: Chronic | ICD-10-CM

## 2025-08-16 DIAGNOSIS — Z95.2 PRESENCE OF PROSTHETIC HEART VALVE: Chronic | ICD-10-CM

## 2025-08-16 LAB
ALBUMIN SERPL ELPH-MCNC: 3.6 G/DL — SIGNIFICANT CHANGE UP (ref 3.3–5)
ALP SERPL-CCNC: 70 U/L — SIGNIFICANT CHANGE UP (ref 40–120)
ALT FLD-CCNC: 44 U/L — SIGNIFICANT CHANGE UP (ref 12–78)
ANION GAP SERPL CALC-SCNC: 6 MMOL/L — SIGNIFICANT CHANGE UP (ref 5–17)
APPEARANCE UR: CLEAR — SIGNIFICANT CHANGE UP
APTT BLD: 30 SEC — SIGNIFICANT CHANGE UP (ref 26.1–36.8)
AST SERPL-CCNC: 50 U/L — HIGH (ref 15–37)
BASOPHILS # BLD AUTO: 0.06 K/UL — SIGNIFICANT CHANGE UP (ref 0–0.2)
BASOPHILS NFR BLD AUTO: 0.6 % — SIGNIFICANT CHANGE UP (ref 0–2)
BILIRUB SERPL-MCNC: 0.5 MG/DL — SIGNIFICANT CHANGE UP (ref 0.2–1.2)
BILIRUB UR-MCNC: NEGATIVE — SIGNIFICANT CHANGE UP
BLD GP AB SCN SERPL QL: SIGNIFICANT CHANGE UP
BUN SERPL-MCNC: 30 MG/DL — HIGH (ref 7–23)
CALCIUM SERPL-MCNC: 10.1 MG/DL — SIGNIFICANT CHANGE UP (ref 8.5–10.1)
CHLORIDE SERPL-SCNC: 107 MMOL/L — SIGNIFICANT CHANGE UP (ref 96–108)
CO2 SERPL-SCNC: 23 MMOL/L — SIGNIFICANT CHANGE UP (ref 22–31)
COLOR SPEC: YELLOW — SIGNIFICANT CHANGE UP
CREAT SERPL-MCNC: 1.78 MG/DL — HIGH (ref 0.5–1.3)
D DIMER BLD IA.RAPID-MCNC: 1044 NG/ML DDU — HIGH
DIFF PNL FLD: NEGATIVE — SIGNIFICANT CHANGE UP
EGFR: 37 ML/MIN/1.73M2 — LOW
EGFR: 37 ML/MIN/1.73M2 — LOW
EOSINOPHIL # BLD AUTO: 0.13 K/UL — SIGNIFICANT CHANGE UP (ref 0–0.5)
EOSINOPHIL NFR BLD AUTO: 1.4 % — SIGNIFICANT CHANGE UP (ref 0–6)
GLUCOSE SERPL-MCNC: 79 MG/DL — SIGNIFICANT CHANGE UP (ref 70–99)
GLUCOSE UR QL: NEGATIVE MG/DL — SIGNIFICANT CHANGE UP
HCT VFR BLD CALC: 48.9 % — SIGNIFICANT CHANGE UP (ref 39–50)
HGB BLD-MCNC: 16.5 G/DL — SIGNIFICANT CHANGE UP (ref 13–17)
IMM GRANULOCYTES # BLD AUTO: 0.02 K/UL — SIGNIFICANT CHANGE UP (ref 0–0.07)
IMM GRANULOCYTES NFR BLD AUTO: 0.2 % — SIGNIFICANT CHANGE UP (ref 0–0.9)
INR BLD: 1.03 RATIO — SIGNIFICANT CHANGE UP (ref 0.85–1.16)
KETONES UR QL: NEGATIVE MG/DL — SIGNIFICANT CHANGE UP
LEUKOCYTE ESTERASE UR-ACNC: NEGATIVE — SIGNIFICANT CHANGE UP
LYMPHOCYTES # BLD AUTO: 2.88 K/UL — SIGNIFICANT CHANGE UP (ref 1–3.3)
LYMPHOCYTES NFR BLD AUTO: 30.8 % — SIGNIFICANT CHANGE UP (ref 13–44)
MCHC RBC-ENTMCNC: 30.7 PG — SIGNIFICANT CHANGE UP (ref 27–34)
MCHC RBC-ENTMCNC: 33.7 G/DL — SIGNIFICANT CHANGE UP (ref 32–36)
MCV RBC AUTO: 91.1 FL — SIGNIFICANT CHANGE UP (ref 80–100)
MONOCYTES # BLD AUTO: 1.03 K/UL — HIGH (ref 0–0.9)
MONOCYTES NFR BLD AUTO: 11 % — SIGNIFICANT CHANGE UP (ref 2–14)
NEUTROPHILS # BLD AUTO: 5.24 K/UL — SIGNIFICANT CHANGE UP (ref 1.8–7.4)
NEUTROPHILS NFR BLD AUTO: 56 % — SIGNIFICANT CHANGE UP (ref 43–77)
NITRITE UR-MCNC: NEGATIVE — SIGNIFICANT CHANGE UP
NRBC # BLD AUTO: 0 K/UL — SIGNIFICANT CHANGE UP (ref 0–0)
NRBC # FLD: 0 K/UL — SIGNIFICANT CHANGE UP (ref 0–0)
NRBC BLD AUTO-RTO: 0 /100 WBCS — SIGNIFICANT CHANGE UP (ref 0–0)
PH UR: 5 — SIGNIFICANT CHANGE UP (ref 5–8)
PLATELET # BLD AUTO: 116 K/UL — LOW (ref 150–400)
PMV BLD: 10 FL — SIGNIFICANT CHANGE UP (ref 7–13)
POTASSIUM SERPL-MCNC: 5.8 MMOL/L — HIGH (ref 3.5–5.3)
POTASSIUM SERPL-SCNC: 5.8 MMOL/L — HIGH (ref 3.5–5.3)
PROT SERPL-MCNC: 7.1 GM/DL — SIGNIFICANT CHANGE UP (ref 6–8.3)
PROT UR-MCNC: NEGATIVE MG/DL — SIGNIFICANT CHANGE UP
PROTHROM AB SERPL-ACNC: 11.8 SEC — SIGNIFICANT CHANGE UP (ref 9.9–13.4)
RBC # BLD: 5.37 M/UL — SIGNIFICANT CHANGE UP (ref 4.2–5.8)
RBC # FLD: 15.4 % — HIGH (ref 10.3–14.5)
SODIUM SERPL-SCNC: 136 MMOL/L — SIGNIFICANT CHANGE UP (ref 135–145)
SP GR SPEC: 1.02 — SIGNIFICANT CHANGE UP (ref 1–1.03)
TROPONIN I, HIGH SENSITIVITY RESULT: 19.97 NG/L — SIGNIFICANT CHANGE UP
TROPONIN I, HIGH SENSITIVITY RESULT: 22.27 NG/L — SIGNIFICANT CHANGE UP
UROBILINOGEN FLD QL: 0.2 MG/DL — SIGNIFICANT CHANGE UP (ref 0.2–1)
WBC # BLD: 9.36 K/UL — SIGNIFICANT CHANGE UP (ref 3.8–10.5)
WBC # FLD AUTO: 9.36 K/UL — SIGNIFICANT CHANGE UP (ref 3.8–10.5)

## 2025-08-16 PROCEDURE — 93010 ELECTROCARDIOGRAM REPORT: CPT

## 2025-08-16 PROCEDURE — 86850 RBC ANTIBODY SCREEN: CPT

## 2025-08-16 PROCEDURE — 85610 PROTHROMBIN TIME: CPT

## 2025-08-16 PROCEDURE — 71045 X-RAY EXAM CHEST 1 VIEW: CPT | Mod: 26

## 2025-08-16 PROCEDURE — 36415 COLL VENOUS BLD VENIPUNCTURE: CPT

## 2025-08-16 PROCEDURE — 80053 COMPREHEN METABOLIC PANEL: CPT

## 2025-08-16 PROCEDURE — 86900 BLOOD TYPING SEROLOGIC ABO: CPT

## 2025-08-16 PROCEDURE — 93005 ELECTROCARDIOGRAM TRACING: CPT

## 2025-08-16 PROCEDURE — 81003 URINALYSIS AUTO W/O SCOPE: CPT

## 2025-08-16 PROCEDURE — 71045 X-RAY EXAM CHEST 1 VIEW: CPT

## 2025-08-16 PROCEDURE — 84484 ASSAY OF TROPONIN QUANT: CPT

## 2025-08-16 PROCEDURE — 85379 FIBRIN DEGRADATION QUANT: CPT

## 2025-08-16 PROCEDURE — 86901 BLOOD TYPING SEROLOGIC RH(D): CPT

## 2025-08-16 PROCEDURE — 85025 COMPLETE CBC W/AUTO DIFF WBC: CPT

## 2025-08-16 PROCEDURE — 99285 EMERGENCY DEPT VISIT HI MDM: CPT | Mod: 25

## 2025-08-16 PROCEDURE — 85730 THROMBOPLASTIN TIME PARTIAL: CPT

## 2025-08-16 PROCEDURE — 99285 EMERGENCY DEPT VISIT HI MDM: CPT

## 2025-08-16 RX ADMIN — Medication 3 MILLILITER(S): at 19:48

## (undated) DEVICE — SEALER BIPOLAR 6.0 AQUAMANTYS

## (undated) DEVICE — BLANKET WARMER UPPER ADULT

## (undated) DEVICE — HOOD FLYTE STRYKER HELMET SHIELD

## (undated) DEVICE — SOL IRR POUR H2O 1500ML

## (undated) DEVICE — SUT VICRYL PLUS 1 27" CP UNDYED

## (undated) DEVICE — CUFF TOURNIQUET 34" DUAL PORT W PLC

## (undated) DEVICE — SOL IRR BAG NS 0.9% 3000ML

## (undated) DEVICE — SUT VICRYL PLUS 2-0 27" CP-1 UNDYED

## (undated) DEVICE — IRRISEPT JET LAVAGE W 0.05 PCT CHG

## (undated) DEVICE — ELCTR PENCIL NEPTUNE SMOKE EVACUATION

## (undated) DEVICE — DRSG COMBINE 5X9"

## (undated) DEVICE — WRAP COMPRESSION CALF MED

## (undated) DEVICE — SOL IRR POUR NS 0.9% 500ML

## (undated) DEVICE — DEVICE ORTHALIGN PLUS

## (undated) DEVICE — SYR LUER LOK 50CC

## (undated) DEVICE — SYM-STRYKER SYSTEM 7: Type: DURABLE MEDICAL EQUIPMENT

## (undated) DEVICE — KIT OPTIVAC CEMENT MIXER 40GM

## (undated) DEVICE — SYR LUER LOK 20CC

## (undated) DEVICE — SUT MONOCRYL 3-0 18" PS-1

## (undated) DEVICE — CONTAINER SPECIMEN PET

## (undated) DEVICE — SUT MONOSOF 3-0 30" C-16

## (undated) DEVICE — PACK TOTAL KNEE

## (undated) DEVICE — DRSG 4X4

## (undated) DEVICE — NDL SPINAL 18G X 3.5"

## (undated) DEVICE — KNEEALIGN TIBIAL AND FEMORAL

## (undated) DEVICE — SOLIDIFIER CANN EXPRESS 3K

## (undated) DEVICE — DRSG XEROFORM 1"

## (undated) DEVICE — SUT DERMABOND PRINEO 60CM

## (undated) DEVICE — GOWN XL W TOWEL